# Patient Record
Sex: FEMALE | Race: WHITE | NOT HISPANIC OR LATINO | Employment: FULL TIME | RURAL
[De-identification: names, ages, dates, MRNs, and addresses within clinical notes are randomized per-mention and may not be internally consistent; named-entity substitution may affect disease eponyms.]

---

## 2017-03-27 ENCOUNTER — TELEPHONE (OUTPATIENT)
Dept: FAMILY MEDICINE CLINIC | Facility: CLINIC | Age: 32
End: 2017-03-27

## 2017-03-27 RX ORDER — AMOXICILLIN 500 MG/1
500 CAPSULE ORAL 3 TIMES DAILY
Qty: 21 CAPSULE | Refills: 0 | Status: SHIPPED | OUTPATIENT
Start: 2017-03-27 | End: 2017-04-03

## 2017-03-27 NOTE — TELEPHONE ENCOUNTER
Pt called said that she has a  Bad sinus inf is neeing meds called in also she does have a toenail fungus she wants to know what u suggest she do about that? [phone]

## 2017-04-03 ENCOUNTER — TELEPHONE (OUTPATIENT)
Dept: FAMILY MEDICINE CLINIC | Facility: CLINIC | Age: 32
End: 2017-04-03

## 2017-04-03 RX ORDER — METHYLPREDNISOLONE 4 MG/1
TABLET ORAL
Qty: 21 TABLET | Refills: 0 | Status: SHIPPED | OUTPATIENT
Start: 2017-04-03 | End: 2017-10-16

## 2017-04-03 RX ORDER — LEVOFLOXACIN 500 MG/1
500 TABLET, FILM COATED ORAL DAILY
Qty: 10 TABLET | Refills: 0 | Status: SHIPPED | OUTPATIENT
Start: 2017-04-03 | End: 2017-04-13

## 2017-04-03 NOTE — TELEPHONE ENCOUNTER
Says she took amoxcil last week for a sinus infection after 2 days the drainge was clear. Now has the sxs back again. Drainage is yellow again. No fever. Wondering if she needs a stronger abx to [facility]

## 2017-06-28 ENCOUNTER — TELEPHONE (OUTPATIENT)
Dept: FAMILY MEDICINE CLINIC | Facility: CLINIC | Age: 32
End: 2017-06-28

## 2017-06-28 NOTE — TELEPHONE ENCOUNTER
Pt called she wants to know what your thoughts are on the [facility] in [place] regarding them doing prp injections? [phone]

## 2017-08-15 ENCOUNTER — TRANSCRIBE ORDERS (OUTPATIENT)
Dept: ADMINISTRATIVE | Facility: HOSPITAL | Age: 32
End: 2017-08-15

## 2017-10-04 ENCOUNTER — HOSPITAL ENCOUNTER (EMERGENCY)
Age: 32
Discharge: HOME | End: 2017-10-04
Payer: COMMERCIAL

## 2017-10-04 VITALS
HEART RATE: 76 BPM | SYSTOLIC BLOOD PRESSURE: 118 MMHG | HEIGHT: 64 IN | OXYGEN SATURATION: 97 % | WEIGHT: 135 LBS | BODY MASS INDEX: 23.05 KG/M2 | DIASTOLIC BLOOD PRESSURE: 76 MMHG | RESPIRATION RATE: 18 BRPM | TEMPERATURE: 98.7 F

## 2017-10-04 LAB
ALBUMIN SERPL-MCNC: 4.4 G/DL (ref 3.5–5.2)
ALP BLD-CCNC: 47 U/L (ref 35–104)
ALT SERPL-CCNC: 12 U/L (ref 5–33)
ANION GAP SERPL CALCULATED.3IONS-SCNC: 13 MMOL/L (ref 7–19)
AST SERPL-CCNC: 16 U/L (ref 5–32)
BILIRUB SERPL-MCNC: 0.6 MG/DL (ref 0.2–1.2)
BUN BLDV-MCNC: 16 MG/DL (ref 6–20)
CALCIUM SERPL-MCNC: 9.2 MG/DL (ref 8.6–10)
CHLORIDE BLD-SCNC: 101 MMOL/L (ref 98–111)
CO2: 26 MMOL/L (ref 22–29)
CREAT SERPL-MCNC: 0.8 MG/DL (ref 0.5–0.9)
GFR NON-AFRICAN AMERICAN: >60
GLUCOSE BLD-MCNC: 87 MG/DL (ref 74–109)
HAV IGM SER IA-ACNC: NORMAL
HCG QUALITATIVE: NEGATIVE
HCT VFR BLD CALC: 40.9 % (ref 37–47)
HEMOGLOBIN: 13.5 G/DL (ref 12–16)
HEPATITIS B CORE IGM ANTIBODY: NORMAL
HEPATITIS B SURFACE ANTIGEN INTERPRETATION: NORMAL
HEPATITIS C ANTIBODY INTERPRETATION: NORMAL
MCH RBC QN AUTO: 28.5 PG (ref 27–31)
MCHC RBC AUTO-ENTMCNC: 33 G/DL (ref 33–37)
MCV RBC AUTO: 86.3 FL (ref 81–99)
PDW BLD-RTO: 12.9 % (ref 11.5–14.5)
PLATELET # BLD: 273 K/UL (ref 130–400)
PMV BLD AUTO: 8.6 FL (ref 9.4–12.3)
POTASSIUM SERPL-SCNC: 4 MMOL/L (ref 3.5–5)
RBC # BLD: 4.74 M/UL (ref 4.2–5.4)
SODIUM BLD-SCNC: 140 MMOL/L (ref 136–145)
TOTAL PROTEIN: 7.5 G/DL (ref 6.6–8.7)
WBC # BLD: 6.1 K/UL (ref 4.8–10.8)

## 2017-10-04 PROCEDURE — 80074 ACUTE HEPATITIS PANEL: CPT

## 2017-10-04 PROCEDURE — 99282 EMERGENCY DEPT VISIT SF MDM: CPT | Performed by: NURSE PRACTITIONER

## 2017-10-04 PROCEDURE — 99282 EMERGENCY DEPT VISIT SF MDM: CPT

## 2017-10-04 PROCEDURE — 80053 COMPREHEN METABOLIC PANEL: CPT

## 2017-10-04 PROCEDURE — 86703 HIV-1/HIV-2 1 RESULT ANTBDY: CPT

## 2017-10-04 PROCEDURE — 36415 COLL VENOUS BLD VENIPUNCTURE: CPT

## 2017-10-04 PROCEDURE — 85027 COMPLETE CBC AUTOMATED: CPT

## 2017-10-04 PROCEDURE — 84703 CHORIONIC GONADOTROPIN ASSAY: CPT

## 2017-10-04 NOTE — ED TRIAGE NOTES
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  Pt reports to ED for blood work

## 2017-10-04 NOTE — ED AVS SNAPSHOT
new information is available in [employer]. 9. Click Sign Up. You can now view your medical record. Additional Information  If you have questions, please contact the physician practice where you receive care. Remember, MyChart is NOT to be used for urgent needs. For medical emergencies, dial 911. For questions regarding your MyChart account call [phone]. If you have a clinical question, please call your doctor's office. View your information online  ? Review your current list of  medications, immunization, and allergies. ? Review your future test results online . ? Review your discharge instructions provided by your caregivers at discharge    Certain functionality such as prescription refills, scheduling appointments or sending messages to your provider are not activated if your provider does not use [employer] in his/her office    For questions regarding your [employer] account call [phone]. If you have a clinical question, please call your doctor's office. The information on all pages of the After Visit Summary has been reviewed with me, the patient and/or responsible adult, by my health care provider(s). I had the opportunity to ask questions regarding this information. I understand I should dispose of my armband safely at home to protect my health information. A complete copy of the After Visit Summary has been given to me, the patient and/or responsible adult.          Patient Signature/Responsible Adult: ___________________________________    Nurse Signature: ___________________________________  Resident/MLP Signature: ___________________________________  Attending Signature: ___________________________________    Date:____________Time:____________              More Information           Exposure to Blood and Body Fluids: Care Instructions  Your Care Instructions  When you are caring for another person, there's always a chance that you If you do not have an account, please click on the \"Sign Up Now\" link. Current as of: March 20, 2017  Content Version: 11.3  © 6226-0977 [employer]. Care instructions adapted under license by [facility] [place] ([facility]). If you have questions about a medical condition or this instruction, always ask your healthcare professional. [address] any warranty or liability for your use of this information.

## 2017-10-04 NOTE — ED PROVIDER NOTES
[facility] EMERGENCY DEPT  eMERGENCY dEPARTMENT eNCOUnter      Pt Name: Cheyanne Ortiz  MRN: [id number]  [place] 1985  Date of evaluation: 10/4/2017  Provider: VILMA Fuller    CHIEF COMPLAINT       Chief Complaint   Patient presents with    Other     needle stick at work          HISTORY OF PRESENT ILLNESS   (Location/Symptom, Timing/Onset, Context/Setting, Quality, Duration, Modifying Factors, Severity)  Note limiting factors. Cheyanne Ortiz is a 34 y.o. female who presents to the emergency department for evaluation of needle stick. 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 She tells me that she was poked by a dirty suture needle through her glove today. She tells me the she washed her hands immediately with soap and water. She is anticipating contacting the young women who was the patient for consent to HIV and hepatitis testing. Pt tells me that she received hep b series through her dental program. She didt have a titer drawn after the procedure. HPI    Nursing Notes were reviewed. REVIEW OF SYSTEMS    (2-9 systems for level 4, 10 or more for level 5)     Review of Systems   Constitutional: Negative. Skin: Positive for wound (left thumb base). A complete review of systems was performed and is negative except as noted above in the HPI. PAST MEDICAL HISTORY     Past Medical History:   Diagnosis Date    Anxiety          SURGICAL HISTORY       Past Surgical History:   Procedure Laterality Date     SECTION           CURRENT MEDICATIONS       Discharge Medication List as of 10/4/2017 11:40 AM      CONTINUE these medications which have NOT CHANGED    Details   BusPIRone HCl (BUSPAR PO) Take by mouth nightlyHistorical Med             ALLERGIES     Review of patient's allergies indicates no known allergies. FAMILY HISTORY     History reviewed. No pertinent family history.        SOCIAL HISTORY       Social History     Social History  Marital status:      Spouse name: N/A    Number of children: N/A    Years of education: N/A     Social History Main Topics    Smoking status: Never Smoker    Smokeless tobacco: Never Used    Alcohol use Yes      Comment: occasional    Drug use: No    Sexual activity: Yes     Other Topics Concern    None     Social History Narrative    None       SCREENINGS             PHYSICAL EXAM    (up to 7 for level 4, 8 or more for level 5)   ED Triage Vitals   BP Temp Temp src Pulse Resp SpO2 Height Weight   -- -- -- -- -- -- -- --               Vitals:    10/04/17 1102   BP: 118/76   Pulse: 76   Resp: 18   Temp: 98.7 °F (37.1 °C)   TempSrc: Oral   SpO2: 97%   Weight: 135 lb (61.2 kg)   Height: 5' 4\" (1.626 m)         Physical Exam   Constitutional: She is oriented to person, place, and time. She appears well-nourished. HENT:   Head: Normocephalic. Pulmonary/Chest: Effort normal.   Neurological: She is alert and oriented to person, place, and time. Skin: Skin is warm and dry. Puncture wound base of left thumb. Vitals reviewed.       DIAGNOSTIC RESULTS     EKG: All EKG's are interpreted by the Emergency Department Physician who either signs or Co-signs this chart in the absence of a cardiologist.        RADIOLOGY:   Non-plain film images such as CT, Ultrasound and MRI are read by the radiologist. Plain radiographic images are visualized and preliminarily interpreted by the emergency physician with the below findings:        Interpretation per the Radiologist below, if available at the time of this note:    No orders to display         ED BEDSIDE ULTRASOUND:   Performed by ED Physician - none    LABS:  Labs Reviewed   CBC - Abnormal; Notable for the following:        Result Value    MPV 8.6 (*)     All other components within normal limits   COMPREHENSIVE METABOLIC PANEL   HEPATITIS PANEL, ACUTE   HCG, SERUM, QUALITATIVE   HIV-1,-2 W/REFLEX TO HIV-1 WESTERN BLOT       All other labs were within normal range or not returned as of this dictation. RE-ASSESSMENT     Spoke with [facility]-clinician' post exposure prophylaxis line [phone] w/recommendation not to give hiv prophylaxis. Discussed with patient that it has been estimated that risk 0.35% which is a chance of three conversion in 1,000 HiV positive needle sticks. This was discussed with patient giving risks/benefits of prophylaxis and she declined. EMERGENCY DEPARTMENT COURSE and DIFFERENTIAL DIAGNOSIS/MDM:   Vitals:    Vitals:    10/04/17 1102   BP: 118/76   Pulse: 76   Resp: 18   Temp: 98.7 °F (37.1 °C)   TempSrc: Oral   SpO2: 97%   Weight: 135 lb (61.2 kg)   Height: 5' 4\" (1.626 m)       MDM      CONSULTS:  None    PROCEDURES:  Unless otherwise noted below, none     Procedures    FINAL IMPRESSION      1.  Needle stick injury of finger of left hand, initial encounter          DISPOSITION/PLAN   DISPOSITION     PATIENT REFERRED TO:  your doctor    Schedule an appointment as soon as possible for a visit        DISCHARGE MEDICATIONS:       Discharge Medication List as of 10/4/2017 11:40 AM          (Please note that portions of this note were completed with a voice recognition program.  Efforts were made to edit the dictations but occasionally words are mis-transcribed.)             Adalid Nicholson, APRN  10/04/17 5556

## 2017-10-06 ENCOUNTER — TELEPHONE (OUTPATIENT)
Dept: FAMILY MEDICINE CLINIC | Facility: CLINIC | Age: 32
End: 2017-10-06

## 2017-10-06 LAB — HIV-1 AND HIV-2 ANTIBODIES: NEGATIVE

## 2017-10-06 NOTE — TELEPHONE ENCOUNTER
She got stuck with a suture needle at the Dentist office. She was sent to the ER and had blood work for hepatitis, HIV. Wants to know if she needs to get a follow up appt to discuss lab results, and what protocol to follow?

## 2017-10-16 ENCOUNTER — OFFICE VISIT (OUTPATIENT)
Dept: FAMILY MEDICINE CLINIC | Facility: CLINIC | Age: 32
End: 2017-10-16

## 2017-10-16 VITALS
WEIGHT: 144 LBS | HEART RATE: 78 BPM | RESPIRATION RATE: 16 BRPM | HEIGHT: 64 IN | OXYGEN SATURATION: 97 % | SYSTOLIC BLOOD PRESSURE: 110 MMHG | DIASTOLIC BLOOD PRESSURE: 76 MMHG | BODY MASS INDEX: 24.59 KG/M2

## 2017-10-16 DIAGNOSIS — IMO0002 NEEDLE STICK INJURY: Primary | ICD-10-CM

## 2017-10-16 PROCEDURE — 99213 OFFICE O/P EST LOW 20 MIN: CPT | Performed by: FAMILY MEDICINE

## 2017-10-16 RX ORDER — NORETHINDRONE ACETATE AND ETHINYL ESTRADIOL, ETHINYL ESTRADIOL AND FERROUS FUMARATE 1MG-10(24)
KIT ORAL
Refills: 11 | COMMUNITY
Start: 2017-09-10 | End: 2018-05-15

## 2017-10-16 RX ORDER — BUSPIRONE HYDROCHLORIDE 7.5 MG/1
7.5 TABLET ORAL 2 TIMES DAILY
COMMUNITY
Start: 2017-10-07 | End: 2023-01-27

## 2017-10-16 NOTE — PROGRESS NOTES
"Subjective   Jeni Aguilar is a 32 y.o. female.     Chief Complaint   Patient presents with   • Follow-up     labs        History of Present Illness     Jeni was exposed to needle stick--the dentist was suturing a patient and was stuck and went to [facility]...she reveals to me the patient was tested \"for  hiv and everything\" and the patient was neg..  She was stuck in hand by her employer, dr stevens and instructed to go to [facility]    Current Outpatient Prescriptions:   •  busPIRone (BUSPAR) 7.5 MG tablet, , Disp: , Rfl:   •  LO LOESTRIN FE 1 MG-10 MCG / 10 MCG tablet, TK 1 T PO QD, Disp: , Rfl: 11  No Known Allergies    No past medical history on file.  No past surgical history on file.    Review of Systems   Constitutional: Negative.    HENT: Negative.    Eyes: Negative.    Respiratory: Negative.    Cardiovascular: Negative.    Gastrointestinal: Negative.    Endocrine: Negative.    Genitourinary: Negative.    Musculoskeletal: Negative.    Skin: Negative.    Allergic/Immunologic: Negative.    Neurological: Negative.    Hematological: Negative.    Psychiatric/Behavioral: Negative.        Objective  /76  Pulse 78  Resp 16  Ht 64\" (162.6 cm)  Wt 144 lb (65.3 kg)  SpO2 97%  BMI 24.72 kg/m2  Physical Exam   Constitutional: She appears well-developed and well-nourished.   HENT:   Head: Normocephalic.   Eyes: Pupils are equal, round, and reactive to light.   Neck: Normal range of motion.   Cardiovascular: Normal rate.    Pulmonary/Chest: Effort normal.   Abdominal: Soft.   Musculoskeletal: Normal range of motion.   Neurological: She is alert. She has normal reflexes.   Skin: Skin is warm.   Needle stick inijury has healed   Psychiatric: She has a normal mood and affect. Her behavior is normal. Judgment and thought content normal.   Nursing note and vitals reviewed.      Assessment/Plan   Jeni was seen today for follow-up.    Diagnoses and all orders for this visit:    Needle stick injury    --low " risk    recommeded labs 3mos, 6mos and 12mos       No orders of the defined types were placed in this encounter.      Follow up: prn

## 2018-02-16 ENCOUNTER — TELEPHONE (OUTPATIENT)
Dept: FAMILY MEDICINE CLINIC | Facility: CLINIC | Age: 33
End: 2018-02-16

## 2018-02-16 DIAGNOSIS — IMO0002 NEEDLE STICK INJURY: Primary | ICD-10-CM

## 2018-02-16 NOTE — TELEPHONE ENCOUNTER
Hiv, rpr, hepatits profile now  And in 6mos and in one year--be sure we jhave billing info for this

## 2018-02-17 LAB
HAV AB SER QL IA: NEGATIVE
HBV CORE AB SERPL QL IA: NEGATIVE
HBV SURFACE AB SER QL: REACTIVE
HBV SURFACE AG SERPL QL IA: NEGATIVE
HCV AB S/CO SERPL IA: <0.1 S/CO RATIO (ref 0–0.9)
HIV 1+2 AB+HIV1 P24 AG SERPL QL IA: NON REACTIVE
RPR SER QL: NON REACTIVE

## 2018-05-07 ENCOUNTER — TELEPHONE (OUTPATIENT)
Dept: FAMILY MEDICINE CLINIC | Facility: CLINIC | Age: 33
End: 2018-05-07

## 2018-05-07 DIAGNOSIS — J32.9 CHRONIC SINUSITIS, UNSPECIFIED LOCATION: Primary | ICD-10-CM

## 2018-05-07 RX ORDER — LEVOFLOXACIN 500 MG/1
500 TABLET, FILM COATED ORAL DAILY
Qty: 14 TABLET | Refills: 0 | Status: SHIPPED | OUTPATIENT
Start: 2018-05-07 | End: 2018-05-15

## 2018-05-15 ENCOUNTER — OFFICE VISIT (OUTPATIENT)
Dept: OTOLARYNGOLOGY | Facility: CLINIC | Age: 33
End: 2018-05-15

## 2018-05-15 VITALS
HEART RATE: 77 BPM | BODY MASS INDEX: 24.59 KG/M2 | DIASTOLIC BLOOD PRESSURE: 88 MMHG | WEIGHT: 144 LBS | SYSTOLIC BLOOD PRESSURE: 124 MMHG | TEMPERATURE: 98 F | HEIGHT: 64 IN

## 2018-05-15 DIAGNOSIS — J34.3 HYPERTROPHY OF INFERIOR NASAL TURBINATE: ICD-10-CM

## 2018-05-15 DIAGNOSIS — J30.9 ALLERGIC RHINITIS, UNSPECIFIED CHRONICITY, UNSPECIFIED SEASONALITY, UNSPECIFIED TRIGGER: ICD-10-CM

## 2018-05-15 DIAGNOSIS — J32.9 CHRONIC SINUSITIS, UNSPECIFIED LOCATION: Primary | ICD-10-CM

## 2018-05-15 DIAGNOSIS — J34.2 DEVIATED NASAL SEPTUM: ICD-10-CM

## 2018-05-15 DIAGNOSIS — J01.90 ACUTE SINUSITIS, RECURRENCE NOT SPECIFIED, UNSPECIFIED LOCATION: ICD-10-CM

## 2018-05-15 PROCEDURE — 99203 OFFICE O/P NEW LOW 30 MIN: CPT | Performed by: NURSE PRACTITIONER

## 2018-05-15 RX ORDER — OLOPATADINE HYDROCHLORIDE 665 UG/1
2 SPRAY NASAL 2 TIMES DAILY
Qty: 1 BOTTLE | Refills: 5 | Status: SHIPPED | OUTPATIENT
Start: 2018-05-15 | End: 2018-09-18 | Stop reason: SDUPTHER

## 2018-05-15 RX ORDER — LEVOFLOXACIN 500 MG/1
500 TABLET, FILM COATED ORAL DAILY
COMMUNITY
End: 2018-07-05

## 2018-05-15 RX ORDER — METHYLPREDNISOLONE 4 MG/1
TABLET ORAL
Qty: 1 EACH | Refills: 0 | Status: SHIPPED | OUTPATIENT
Start: 2018-05-15 | End: 2018-05-21

## 2018-05-15 RX ORDER — FLUTICASONE PROPIONATE 50 MCG
1 SPRAY, SUSPENSION (ML) NASAL 2 TIMES DAILY
COMMUNITY
Start: 2018-05-08 | End: 2018-09-18 | Stop reason: SDUPTHER

## 2018-05-15 NOTE — PATIENT INSTRUCTIONS
Medical vs surgical options discussed    Call for problems or worsening symptoms    Avoidance of allergies discussed.  Use masks when performing yardwork or cleaning activities if indicated.  Continue allergy medications as discussed.    Air purifier as needed.  If on nasal sprays, recommend to use daily as indicated.   Medical vs surgical options discussed.

## 2018-05-15 NOTE — PROGRESS NOTES
YOB: 1985  Location: [facility]  Location Address: [address] [place] [address], [facility], [address] [place] [address]  Location Phone: [phone]    Chief Complaint   Patient presents with   • Sinus Problem       History of Present Illness  Jeni Aguilar is a 32 y.o. female.  Jeni Aguilar is here for evaluation of ENT complaints. The patient has had problems with nasal congestion, sinusitis, repeated sinusitis, sinus pressure and allergy  The symptoms are not localized to a particular location. The patient has had severe symptoms. The symptoms have been present for the last 1 month The symptoms are aggravated by  allergy. The symptoms are improved by no identifiable factors. She has been treated with oral abx Augmentin, Flonase OTC allergy meds without improvement.  She is now taking Levaquin. She had imaging studies at her dentist office - images not able to pull up from disc.  She reports sinus pressure bilaterally.  Reports allergies has never been tested.  She has a lot of nasal drainage.  Denies nasal trauma.       Past Medical History:   Diagnosis Date   • Allergic rhinitis        Past Surgical History:   Procedure Laterality Date   •  SECTION         Outpatient Prescriptions Marked as Taking for the 5/15/18 encounter (Office Visit) with JERRY Christie   Medication Sig Dispense Refill   • busPIRone (BUSPAR) 7.5 MG tablet      • fluticasone (FLONASE) 50 MCG/ACT nasal spray      • levoFLOXacin (LEVAQUIN) 500 MG tablet Take 500 mg by mouth Daily.     • Pseudoephedrine-Ibuprofen (ADVIL COLD/SINUS PO) Take  by mouth.     • Sodium Chloride-Sodium Bicarb (NETI POT SINUS WASH NA) into each nostril.     • [DISCONTINUED] levoFLOXacin (LEVAQUIN) 500 MG tablet Take 1 tablet by mouth Daily. 14 tablet 0       Review of patient's allergies indicates no known allergies.    Family History   Problem Relation Age of Onset   • No Known Problems Mother    • No Known Problems Father        Social History      Social History   • Marital status: Unknown     Spouse name: N/A   • Number of children: N/A   • Years of education: N/A     Occupational History   • Not on file.     Social History Main Topics   • Smoking status: Never Smoker   • Smokeless tobacco: Never Used   • Alcohol use Yes      Comment: rarely   • Drug use: Unknown   • Sexual activity: Not on file     Other Topics Concern   • Not on file     Social History Narrative   • No narrative on file       Review of Systems   Constitutional: Negative.    HENT:        SEE HPI   Eyes: Negative.    Respiratory: Negative.    Cardiovascular: Negative.    Gastrointestinal: Negative.    Endocrine: Negative.    Genitourinary: Negative.    Musculoskeletal: Negative.    Skin: Negative.    Allergic/Immunologic: Negative.    Neurological: Negative.    Hematological: Negative.    Psychiatric/Behavioral: Negative.        Vitals:    05/15/18 1007   BP: 124/88   Pulse: 77   Temp: 98 °F (36.7 °C)       Body mass index is 24.72 kg/m².    Objective     Physical Exam  CONSTITUTIONAL: well nourished, alert, oriented, in no acute distress     COMMUNICATION AND VOICE: able to communicate normally, normal voice quality    HEAD: normocephalic, no lesions, atraumatic, no tenderness, no masses     FACE: appearance normal, no lesions, no tenderness, no deformities, facial motion symmetric    SALIVARY GLANDS: parotid glands with no tenderness, no swelling, no masses, submandibular glands with normal size, nontender    EYES: ocular motility normal, eyelids normal, orbits normal, no proptosis, conjunctiva normal , pupils equal, round     EARS:  Hearing: response to conversational voice normal bilaterally   External Ears: auricles without lesions  Otoscopic: tympanic membrane appearance normal, no lesions, no perforation, normal mobility, no fluid    NOSE:  External Nose: right dorsal deviation  Intranasal Exam: nasal mucosa edema,  vestibule within normal limits, inferior turbinate hypertrophy,  left septal deviation    ORAL:  Lips: upper and lower lips without lesion   Teeth: dentition within normal limits for age   Gums: gingivae healthy   Oral Mucosa: oral mucosa normal, no mucosal lesions   Floor of Mouth: Warthin’s duct patent, mucosa normal  Tongue: lingual mucosa normal without lesions, normal tongue mobility   Palate: soft and hard palates with normal mucosa and structure  Oropharynx: oropharyngeal mucosa normal    NECK: neck appearance normal, no mass,  noted without erythema or tenderness    LYMPH NODES: no lymphadenopathy    CHEST/RESPIRATORY: respiratory effort normal  CARDIOVASCULAR:  extremities without cyanosis or edema      NEUROLOGIC/PSYCHIATRIC: oriented to time, place and person, mood normal, affect appropriate, CN II-XII intact grossly    Assessment/Plan   Jeni was seen today for sinus problem.    Diagnoses and all orders for this visit:    Chronic sinusitis, unspecified location  -     CT Sinus Without Contrast; Future    Deviated nasal septum    Hypertrophy of inferior nasal turbinate    Acute sinusitis, recurrence not specified, unspecified location    Allergic rhinitis, unspecified chronicity, unspecified seasonality, unspecified trigger    Other orders  -     MethylPREDNISolone (MEDROL) 4 MG tablet; Take as directed on package instructions.  -     olopatadine (PATANASE) 0.6 % solution nasal solution; 2 sprays by Each Nare route 2 (Two) Times a Day.      * Surgery not found *  Orders Placed This Encounter   Procedures   • CT Sinus Without Contrast     Standing Status:   Future     Standing Expiration Date:   5/15/2019     Order Specific Question:   Patient Pregnant     Answer:   Unknown     Return in about 4 weeks (around 6/12/2018).       Patient Instructions   Medical vs surgical options discussed    Call for problems or worsening symptoms    Avoidance of allergies discussed.  Use masks when performing yardwork or cleaning activities if indicated.  Continue allergy medications  as discussed.    Air purifier as needed.  If on nasal sprays, recommend to use daily as indicated.   Medical vs surgical options discussed.

## 2018-07-05 ENCOUNTER — OFFICE VISIT (OUTPATIENT)
Dept: OTOLARYNGOLOGY | Facility: CLINIC | Age: 33
End: 2018-07-05

## 2018-07-05 ENCOUNTER — CLINICAL SUPPORT (OUTPATIENT)
Dept: OTOLARYNGOLOGY | Facility: CLINIC | Age: 33
End: 2018-07-05

## 2018-07-05 VITALS
BODY MASS INDEX: 26.12 KG/M2 | HEIGHT: 64 IN | WEIGHT: 153 LBS | SYSTOLIC BLOOD PRESSURE: 122 MMHG | DIASTOLIC BLOOD PRESSURE: 77 MMHG | TEMPERATURE: 98.6 F

## 2018-07-05 DIAGNOSIS — J34.3 HYPERTROPHY OF INFERIOR NASAL TURBINATE: ICD-10-CM

## 2018-07-05 DIAGNOSIS — J30.9 CHRONIC ALLERGIC RHINITIS, UNSPECIFIED SEASONALITY, UNSPECIFIED TRIGGER: ICD-10-CM

## 2018-07-05 DIAGNOSIS — J01.00 SUBACUTE MAXILLARY SINUSITIS: ICD-10-CM

## 2018-07-05 DIAGNOSIS — J34.2 DEVIATED NASAL SEPTUM: ICD-10-CM

## 2018-07-05 DIAGNOSIS — J32.9 CHRONIC SINUSITIS, UNSPECIFIED LOCATION: Primary | ICD-10-CM

## 2018-07-05 DIAGNOSIS — J34.89 CONCHA BULLOSA: ICD-10-CM

## 2018-07-05 PROCEDURE — 99214 OFFICE O/P EST MOD 30 MIN: CPT | Performed by: NURSE PRACTITIONER

## 2018-07-05 PROCEDURE — 70486 CT MAXILLOFACIAL W/O DYE: CPT | Performed by: NURSE PRACTITIONER

## 2018-07-05 RX ORDER — FEXOFENADINE HYDROCHLORIDE 60 MG/1
60 TABLET, FILM COATED ORAL DAILY
COMMUNITY

## 2018-07-05 RX ORDER — NORETHINDRONE ACETATE AND ETHINYL ESTRADIOL, ETHINYL ESTRADIOL AND FERROUS FUMARATE 1MG-10(24)
1-10 KIT ORAL DAILY
COMMUNITY
Start: 2018-06-25 | End: 2018-10-24

## 2018-07-05 NOTE — PATIENT INSTRUCTIONS
Avoidance of allergies discussed.  Use masks when performing yardwork or cleaning activities if indicated.  Continue allergy medications as discussed.    Air purifier as needed.  If on nasal sprays, recommend to use daily as indicated.   Medical vs surgical options discussed.    Call for problems or worsening symptoms

## 2018-07-05 NOTE — PROGRESS NOTES
YOB: 1985  Location: [facility]  Location Address: [address] [place] [address], [facility], [address] [place] [address]  Location Phone: [phone]    Chief Complaint   Patient presents with   • Chronic sinusitis     Patient is here for a follow-up for chronic sinusitis.       History of Present Illness  Jeni Aguilar is a 32 y.o. female.  Jeni Aguilar is here for follow up of ENT complaints. The patient has had problems with nasal congestion, repeated sinusitis and allergy  The symptoms are not localized to a particular location. The patient has had improving symptoms. The symptoms have been present for the last several months The symptoms are aggravated by  no identifiable factors. The symptoms are improved by no identifiable factors.  She has 1-2 sinus infections per years.  Flonase and Astelin are helping      CT IMAGES reviewed         Past Medical History:   Diagnosis Date   • Allergic rhinitis        Past Surgical History:   Procedure Laterality Date   •  SECTION         Outpatient Prescriptions Marked as Taking for the 18 encounter (Office Visit) with JERRY Christie   Medication Sig Dispense Refill   • busPIRone (BUSPAR) 7.5 MG tablet 7.5 mg every night at bedtime.     • fexofenadine (ALLEGRA) 60 MG tablet Take 60 mg by mouth Daily.     • fluticasone (FLONASE) 50 MCG/ACT nasal spray 1 spray into each nostril 2 (Two) Times a Day.     • LO LOESTRIN FE 1 MG-10 MCG / 10 MCG tablet 1-10 mcg Daily.     • olopatadine (PATANASE) 0.6 % solution nasal solution 2 sprays by Each Nare route 2 (Two) Times a Day. 1 bottle 5   • Pseudoephedrine-Ibuprofen (ADVIL COLD/SINUS PO) Take  by mouth As Needed.     • [DISCONTINUED] Sodium Chloride-Sodium Bicarb (NETI POT SINUS WASH NA) into each nostril.         Patient has no known allergies.    Family History   Problem Relation Age of Onset   • No Known Problems Mother    • No Known Problems Father        Social History     Social History   • Marital  status: Unknown     Spouse name: N/A   • Number of children: N/A   • Years of education: N/A     Occupational History   • Not on file.     Social History Main Topics   • Smoking status: Never Smoker   • Smokeless tobacco: Never Used   • Alcohol use Yes      Comment: rarely   • Drug use: Unknown   • Sexual activity: Not on file     Other Topics Concern   • Not on file     Social History Narrative   • No narrative on file       Review of Systems   Constitutional: Negative.    HENT:        SEE HPI   Eyes: Negative.    Respiratory: Negative.    Cardiovascular: Negative.    Gastrointestinal: Negative.    Endocrine: Negative.    Genitourinary: Negative.    Musculoskeletal: Negative.    Skin: Negative.    Allergic/Immunologic: Positive for environmental allergies.   Neurological: Negative.    Hematological: Negative.    Psychiatric/Behavioral: Negative.        Vitals:    07/05/18 1347   BP: 122/77   Temp: 98.6 °F (37 °C)       Body mass index is 26.26 kg/m².    Objective     Physical Exam  CONSTITUTIONAL: well nourished, alert, oriented, in no acute distress     COMMUNICATION AND VOICE: able to communicate normally, normal voice quality    HEAD: normocephalic, no lesions, atraumatic, no tenderness, no masses     FACE: appearance normal, no lesions, no tenderness, no deformities, facial motion symmetric    SALIVARY GLANDS: parotid glands with no tenderness, no swelling, no masses, submandibular glands with normal size, nontender    EYES: ocular motility normal, eyelids normal, orbits normal, no proptosis, conjunctiva normal , pupils equal, round     EARS:  Hearing: response to conversational voice normal bilaterally   External Ears: auricles without lesions  Otoscopic: tympanic membrane appearance normal, no lesions, no perforation, normal mobility, no fluid    NOSE:  External Nose: right dorsal deviation  Intranasal Exam: nasal mucosa edema vestibule within normal limits, inferior turbinate hypertrophy, left septal  deviation    ORAL:  Lips: upper and lower lips without lesion   Teeth: dentition within normal limits for age   Gums: gingivae healthy   Oral Mucosa: oral mucosa normal, no mucosal lesions   Floor of Mouth: Warthin’s duct patent, mucosa normal  Tongue: lingual mucosa normal without lesions, normal tongue mobility   Palate: soft and hard palates with normal mucosa and structure  Oropharynx: oropharyngeal mucosa normal    NECK: neck appearance normal, no mass,  noted without erythema or tenderness      LYMPH NODES: no lymphadenopathy    CHEST/RESPIRATORY: respiratory effort normal,      CARDIOVASCULAR:, extremities without cyanosis or edema      NEUROLOGIC/PSYCHIATRIC: oriented to time, place and person, mood normal, affect appropriate, CN II-XII intact grossly    Assessment/Plan   Jeni was seen today for chronic sinusitis.    Diagnoses and all orders for this visit:    Chronic sinusitis, unspecified location    Chronic allergic rhinitis, unspecified seasonality, unspecified trigger    Hypertrophy of inferior nasal turbinate    Deviated nasal septum    Emili bullosa      * Surgery not found *  No orders of the defined types were placed in this encounter.    Return in about 1 year (around 7/5/2019).       Patient Instructions     Avoidance of allergies discussed.  Use masks when performing yardwork or cleaning activities if indicated.  Continue allergy medications as discussed.    Air purifier as needed.  If on nasal sprays, recommend to use daily as indicated.   Medical vs surgical options discussed.    Call for problems or worsening symptoms

## 2018-07-18 DIAGNOSIS — J32.9 CHRONIC SINUSITIS, UNSPECIFIED LOCATION: ICD-10-CM

## 2018-09-13 ENCOUNTER — TELEPHONE (OUTPATIENT)
Dept: FAMILY MEDICINE CLINIC | Facility: CLINIC | Age: 33
End: 2018-09-13

## 2018-09-13 NOTE — TELEPHONE ENCOUNTER
adenike pt stated that she has a bug bite on her arm not sure what kind of a bug or spider she wants to know if u will just take a peek at it in the am when u see her daughter?if so u want me to put in for an appt? [phone]

## 2018-09-14 ENCOUNTER — OFFICE VISIT (OUTPATIENT)
Dept: FAMILY MEDICINE CLINIC | Facility: CLINIC | Age: 33
End: 2018-09-14

## 2018-09-14 VITALS
BODY MASS INDEX: 24.75 KG/M2 | HEIGHT: 64 IN | OXYGEN SATURATION: 98 % | HEART RATE: 74 BPM | DIASTOLIC BLOOD PRESSURE: 74 MMHG | RESPIRATION RATE: 16 BRPM | SYSTOLIC BLOOD PRESSURE: 116 MMHG | WEIGHT: 145 LBS

## 2018-09-14 DIAGNOSIS — S40.861A: Primary | ICD-10-CM
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
## 2018-09-14 PROBLEM — S40.869A INSECT BITE OF UPPER ARM: Status: ACTIVE | Noted: 2018-09-14

## 2018-09-14 PROCEDURE — 99213 OFFICE O/P EST LOW 20 MIN: CPT | Performed by: FAMILY MEDICINE

## 2018-09-14 RX ORDER — MUPIROCIN CALCIUM 20 MG/G
CREAM TOPICAL 3 TIMES DAILY
Qty: 15 G | Refills: 0 | Status: SHIPPED | OUTPATIENT
Start: 2018-09-14 | End: 2018-10-24

## 2018-09-14 NOTE — PROGRESS NOTES
"Subjective   Jeni Aguilar is a 33 y.o. female.     Chief Complaint   Patient presents with   • Insect Bite     right upper arm        History of Present Illness     was bitten by an insect 6 days ago --now with ulcerated area on the right upper arm      Current Outpatient Prescriptions:   •  busPIRone (BUSPAR) 7.5 MG tablet, 7.5 mg every night at bedtime., Disp: , Rfl:   •  fexofenadine (ALLEGRA) 60 MG tablet, Take 60 mg by mouth Daily., Disp: , Rfl:   •  fluticasone (FLONASE) 50 MCG/ACT nasal spray, 1 spray into each nostril 2 (Two) Times a Day., Disp: , Rfl:   •  LO LOESTRIN FE 1 MG-10 MCG / 10 MCG tablet, 1-10 mcg Daily., Disp: , Rfl:   •  mupirocin (BACTROBAN) 2 % cream, Apply  topically to the appropriate area as directed 3 (Three) Times a Day., Disp: 15 g, Rfl: 0  •  olopatadine (PATANASE) 0.6 % solution nasal solution, 2 sprays by Each Nare route 2 (Two) Times a Day., Disp: 1 bottle, Rfl: 5  •  Pseudoephedrine-Ibuprofen (ADVIL COLD/SINUS PO), Take  by mouth As Needed., Disp: , Rfl:   No Known Allergies    Past Medical History:   Diagnosis Date   • Allergic rhinitis      Past Surgical History:   Procedure Laterality Date   •  SECTION         Review of Systems   Skin: Positive for wound.       Objective  /74   Pulse 74   Resp 16   Ht 162.6 cm (64\")   Wt 65.8 kg (145 lb)   SpO2 98%   BMI 24.89 kg/m²   Physical Exam   Constitutional: She appears well-developed and well-nourished.   Skin: Capillary refill takes less than 2 seconds. There is erythema.   Nursing note and vitals reviewed.  noted 1cm exocoriated area right upper arm    Assessment/Plan   Jeni was seen today for insect bite.    Diagnoses and all orders for this visit:    Insect bite of upper arm, right, initial encounter    Other orders  -     mupirocin (BACTROBAN) 2 % cream; Apply  topically to the appropriate area as directed 3 (Three) Times a Day.        She will let me know if not rsolved in 2 weeks         No orders " of the defined types were placed in this encounter.      Follow up: prn

## 2018-09-18 RX ORDER — OLOPATADINE HYDROCHLORIDE 665 UG/1
2 SPRAY NASAL 2 TIMES DAILY
Qty: 1 BOTTLE | Refills: 5 | Status: SHIPPED | OUTPATIENT
Start: 2018-09-18 | End: 2023-01-27

## 2018-09-18 RX ORDER — FLUTICASONE PROPIONATE 50 MCG
1 SPRAY, SUSPENSION (ML) NASAL 2 TIMES DAILY
Qty: 1 BOTTLE | Refills: 6 | Status: SHIPPED | OUTPATIENT
Start: 2018-09-18

## 2018-09-18 RX ORDER — METHYLPREDNISOLONE 4 MG/1
TABLET ORAL
Qty: 1 EACH | Refills: 0 | Status: SHIPPED | OUTPATIENT
Start: 2018-09-18 | End: 2018-09-19 | Stop reason: SDUPTHER

## 2018-09-18 RX ORDER — SULFAMETHOXAZOLE AND TRIMETHOPRIM 800; 160 MG/1; MG/1
1 TABLET ORAL 2 TIMES DAILY
Qty: 20 TABLET | Refills: 0 | Status: SHIPPED | OUTPATIENT
Start: 2018-09-18 | End: 2018-09-28

## 2018-09-19 RX ORDER — METHYLPREDNISOLONE 4 MG/1
TABLET ORAL
Qty: 1 EACH | Refills: 0 | Status: SHIPPED | OUTPATIENT
Start: 2018-09-19 | End: 2018-09-25

## 2018-10-08 DIAGNOSIS — IMO0001 NEEDLE STICK INJURY WITH CONTAMINATED NEEDLE, INITIAL ENCOUNTER: Primary | ICD-10-CM

## 2018-10-24 ENCOUNTER — OFFICE VISIT (OUTPATIENT)
Dept: OTOLARYNGOLOGY | Facility: CLINIC | Age: 33
End: 2018-10-24

## 2018-10-24 VITALS
DIASTOLIC BLOOD PRESSURE: 78 MMHG | SYSTOLIC BLOOD PRESSURE: 110 MMHG | HEIGHT: 64 IN | WEIGHT: 158.2 LBS | BODY MASS INDEX: 27.01 KG/M2 | TEMPERATURE: 98.3 F

## 2018-10-24 DIAGNOSIS — J34.3 HYPERTROPHY OF INFERIOR NASAL TURBINATE: ICD-10-CM

## 2018-10-24 DIAGNOSIS — J31.0 CHRONIC RHINITIS: ICD-10-CM

## 2018-10-24 DIAGNOSIS — J30.9 ALLERGIC RHINITIS, UNSPECIFIED SEASONALITY, UNSPECIFIED TRIGGER: Primary | ICD-10-CM

## 2018-10-24 PROCEDURE — 99214 OFFICE O/P EST MOD 30 MIN: CPT | Performed by: OTOLARYNGOLOGY

## 2018-10-24 NOTE — PATIENT INSTRUCTIONS
For the best response, use your nasal sprays every day without skipping doses. It may take several weeks before the full effect is acheived.   Hold antihistamine containing medications (prescribed and over the counter) 1 week prior to the scheduled allergy testing.       MyPlate from [employer]  The general, healthful diet is based on the 2010 Dietary Guidelines for Americans. The amount of food you need to eat from each food group depends on your age, sex, and level of physical activity and can be individualized by a dietitian. Go to ChooseMyPlate.gov for more information.  What do I need to know about the MyPlate plan?  · Enjoy your food, but eat less.  · Avoid oversized portions.  ? ½ of your plate should include fruits and vegetables.  ? ¼ of your plate should be grains.  ? ¼ of your plate should be protein.  Grains  · Make at least half of your grains whole grains.  · For a 2,000 calorie daily food plan, eat 6 oz every day.  · 1 oz is about 1 slice bread, 1 cup cereal, or ½ cup cooked rice, cereal, or pasta.  Vegetables  · Make half your plate fruits and vegetables.  · For a 2,000 calorie daily food plan, eat 2½ cups every day.  · 1 cup is about 1 cup raw or cooked vegetables or vegetable juice or 2 cups raw leafy greens.  Fruits  · Make half your plate fruits and vegetables.  · For a 2,000 calorie daily food plan, eat 2 cups every day.  · 1 cup is about 1 cup fruit or 100% fruit juice or ½ cup dried fruit.  Protein  · For a 2,000 calorie daily food plan, eat 5½ oz every day.  · 1 oz is about 1 oz meat, poultry, or fish, ¼ cup cooked beans, 1 egg, 1 Tbsp peanut butter, or ½ oz nuts or seeds.  Dairy  · Switch to fat-free or low-fat (1%) milk.  · For a 2,000 calorie daily food plan, eat 3 cups every day.  · 1 cup is about 1 cup milk or yogurt or soy milk (soy beverage), 1½ oz natural cheese, or 2 oz processed cheese.  Fats, Oils, and Empty Calories  · Only small amounts of oils are recommended.  · Empty calories are  calories from solid fats or added sugars.  · Compare sodium in foods like soup, bread, and frozen meals. Choose the foods with lower numbers.  · Drink water instead of sugary drinks.  What foods can I eat?  Grains  Whole grains such as whole wheat, quinoa, millet, and bulgur. Bread, rolls, and pasta made from whole grains. Brown or wild rice. Hot or cold cereals made from whole grains and without added sugar.  Vegetables  All fresh vegetables, especially fresh red, dark green, or orange vegetables. Peas and beans. Low-sodium frozen or canned vegetables prepared without added salt. Low-sodium vegetable juices.  Fruits  All fresh, frozen, and dried fruits. Canned fruit packed in water or fruit juice without added sugar. Fruit juices without added sugar.  Meats and Other Protein Sources  Boiled, baked, or grilled lean meat trimmed of fat. Skinless poultry. Fresh seafood and shellfish. Canned seafood packed in water. Unsalted nuts and unsalted nut butters. Tofu. Dried beans and pea. Eggs.  Dairy  Low-fat or fat-free milk, yogurt, and cheeses.  Sweets and Desserts  Frozen desserts made from low-fat milk.  Fats and Oils  Olive, peanut, and canola oils and margarine. Salad dressing and mayonnaise made from these oils.  Other  Soups and casseroles made from allowed ingredients and without added fat or salt.  The items listed above may not be a complete list of recommended foods or beverages. Contact your dietitian for more options.  What foods are not recommended?  Grains  Sweetened, low-fiber cereals. Packaged baked goods. Snack crackers and chips. Cheese crackers, butter crackers, and biscuits. Frozen waffles, sweet breads, doughnuts, pastries, packaged baking mixes, pancakes, cakes, and cookies.  Vegetables  Regular canned or frozen vegetables or vegetables prepared with salt. Canned tomatoes. Canned tomato sauce. Fried vegetables. Vegetables in cream sauce or cheese sauce.  Fruits  Fruits packed in syrup or made with  added sugar.  Meats and Other Protein Sources  Marbled or fatty meats such as ribs. Poultry with skin. Fried meats, poultry, eggs, or fish. Sausages, hot dogs, and deli meats such as pastrami, bologna, or salami.  Dairy  Whole milk, cream, cheeses made from whole milk, sour cream. Ice cream or yogurt made from whole milk or with added sugar.  Beverages  For adults, no more than one alcoholic drink per day. Regular soft drinks or other sugary beverages. Juice drinks.  Sweets and Desserts  Sugary or fatty desserts, candy, and other sweets.  Fats and Oils  Solid shortening or partially hydrogenated oils. Solid margarine. Margarine that contains trans fats. Butter.  The items listed above may not be a complete list of foods and beverages to avoid. Contact your dietitian for more information.  This information is not intended to replace advice given to you by your health care provider. Make sure you discuss any questions you have with your health care provider.  Document Released: 01/06/2009 Document Revised: 05/25/2017 Document Reviewed: 11/26/2014  [employer] Patient Education © 2018 [employer]

## 2018-10-24 NOTE — PROGRESS NOTES
Kim Ortiz MA   Patient Intake Note    Review of Systems  Review of Systems   HENT:        See hpi     Respiratory: Negative for cough, choking, shortness of breath and wheezing.    Neurological: Positive for headaches. Negative for dizziness and light-headedness.   Hematological: Does not bruise/bleed easily.   Psychiatric/Behavioral: Negative for sleep disturbance.   All other systems reviewed and are negative.      QUALITY MEASURES    Body Mass Index Screening and Follow-Up Plan  Body mass index is 27.15 kg/m².   Patient's Body mass index is 27.15 kg/m². BMI is above normal parameters. Recommendations include: educational material.    Tobacco Use: Screening and Cessation Intervention  Smoking status: Never Smoker                                                              Smokeless tobacco: Never Used                            Kim Ortiz MA  10/24/2018  1:39 PM

## 2018-10-24 NOTE — PROGRESS NOTES
Francisco Santoyo MD     Chief Complaint   Patient presents with   • Follow-up       HPI   Jeni Aguilar is a  33 y.o. female who is here for follow up.  She continues to have intermittent difficulty with nasal congestion and drainage with her nose.  She had a recent flareup a few weeks ago which has resolved.  She tends to have flareups after exposures to hay and other outdoor items.    Review of Systems:  Reviewed per patient intake note    Past History:  Past medical and surgical history, family history and social history reviewed and updated when appropriate.  Current medications and allergies reviewed and updated when appropriate.  Allergies:  Patient has no known allergies.    Vital Signs:   Temp:  [98.3 °F (36.8 °C)] 98.3 °F (36.8 °C)  BP: (110)/(78) 110/78    Physical Exam   CONSTITUTIONAL: well nourished, well-developed, alert, oriented, in no acute distress   COMMUNICATION AND VOICE: able to communicate normally, normal voice quality  HEAD: normocephalic, no lesions, atraumatic, no tenderness, no masses   FACE: appearance normal, no lesions, no tenderness, no deformities, facial motion symmetric  EYES: ocular motility normal, eyelids normal, orbits normal, no proptosis, conjunctiva normal , pupils equal, round  HEARING: response to conversational voice normal bilaterally   EXTERNAL EARS: auricles without lesions  EXTERNAL NOSE: structure normal, no tenderness on palpation, no nasal discharge, no lesions, no evidence of trauma, nostrils patent  INTRANASAL EXAM: nasal mucosa with mucosal congestion and erythema, nasal septum without overt anterior deviation except a left inferior flair off the crest  LIPS: structure normal, no tenderness on palpation, no lesions, no evidence of trauma  NECK: neck appearance normal  LYMPH NODES: no lymphadenopathy  CHEST/RESPIRATORY: respiratory effort normal  CARDIOVASCULAR: extremities without cyanosis or edema, no overt jugulovenous distension  present  NEUROLOGIC/PSYCHIATRIC: oriented appropriately for age, mood normal, affect appropriate, cranial nerves intact grossly unless specifically mentioned above         Assessment   1. Allergic rhinitis, unspecified seasonality, unspecified trigger    2. Chronic rhinitis    3. Hypertrophy of inferior nasal turbinate        Plan    Medical and surgical options were discussed including continued medical management, allergy testing and office turbinoplasty. Risks, benefits and alternatives were discussed and questions were answered. After considering the options, the patient decided to proceed with continued medical management and allergy testing.  Continue current management plan.  Orders Placed This Encounter   Procedures   • Intradermal Allergy Testing       Return in about 6 weeks (around 12/5/2018).    Francisco Santoyo MD  10/24/18  4:42 PM

## 2018-11-02 ENCOUNTER — PROCEDURE VISIT (OUTPATIENT)
Dept: OTOLARYNGOLOGY | Facility: CLINIC | Age: 33
End: 2018-11-02

## 2018-11-02 DIAGNOSIS — J30.9 ALLERGIC RHINITIS, UNSPECIFIED SEASONALITY, UNSPECIFIED TRIGGER: Primary | ICD-10-CM

## 2018-11-02 PROCEDURE — 95004 PERQ TESTS W/ALRGNC XTRCS: CPT | Performed by: OTOLARYNGOLOGY

## 2018-11-02 PROCEDURE — 95024 IQ TESTS W/ALLERGENIC XTRCS: CPT | Performed by: OTOLARYNGOLOGY

## 2018-11-02 NOTE — PROGRESS NOTES
Allergy History Questionnaire  Jeni F Jeff 1985 11/2/2018  Occupation: Dental Assistant    Symptoms  (Check which applies)  Severity? Frequency? When are they worse?   [] Mild [] Constant [x] Spring   [x] Moderate [] Erratic [] Summer   [x] Severe [x] Occasional [x] Fall   [] Variable [x] Seasonal [] Winter     [] Year Round       Do they interfere with your life? Do they interfere with your sleep?   [] Note at all [x] Yes   [] A little [] No   [x] Moderately [x] If yes, please explain: Sinus Pressure with drainage. ___________________________________   [] Prevents normal activity        Please check the symptoms that apply to your allergy symptoms.  Nose Eyes Ears   [] Bleeding [] Infections [] Buzzing   [] Crusting [x] Itching [] Dizziness   [x] Dryness [x] Redness [] Drainage   [x] Itching [] Swollen Lids [] Fullness   [x] Nasal Congestion [x] Watery [] Hearing Loss   [x] Nasal Drainage [] None [] Itching   [] Nasal Polyps  [] Pain   [] Septal Deviation  [] Pressure   [x] Sneezing     [] None         Throat Mouth Chest   [] Burning [] Burning [] Asthma as a child   [x] Dryness [x] Dryness [] Asthma as an adult   [x] Hoarseness [] Itching [] Bronchitis   [] Laryngitis [x] Mouth Breathing [x] Cough   [] Snoring [] None [x] Pneumonia   [x] Sore Throats  [] Wheezing   [] Tonsillectomy  [] None   [] Vocal Cord Polyps     [] None           Skin   [x] Dryness   [x] Eczema   [] Hives   [x] Itching   [] Rash   [] Swelling   [] None     Other Irritants: none    Environment    Inside Home? Smoking Habits?   [] Carpeting [] Cigarettes   [x] Hardwood [] Cigars   [] Fireplace (Gas or Wood Burning) [] Pipe   [x] Laminate Floor [] Years Smoked   [] Plants [] Stopped Smoking    [] Tile [x] Exposed to Secondhand Smoke     Animals in the home? Near your home?   [] Bird(s) [] Barn   [] Cat(s) [] Factory   [x] Dog(s) x 1 indoor small breed [x] Fields   [x] Fish [x] Trees   [] Other [x] Weeds    [x] Water (creek, lake,  pond, river)     Heating your home? Cooling your home?   [x] Electric [x] Central Air Unit   [x] Natural Gas [x] Fan(s)   [] Oil [] Window Unit   [] Propane    [] Wood    [] [employer]/Thermal      Any known allergic reactions to any of the following?   [] Bees   [] Mosquitoes   [] Wasps                 Have you ever been allergy tested in the past?  No    Have you ever had to seek medical treatment in an Emergency Room due to an allergic reaction?  No

## 2018-12-07 PROBLEM — J30.9: Status: ACTIVE | Noted: 2018-12-07

## 2019-07-29 ENCOUNTER — TELEPHONE (OUTPATIENT)
Dept: FAMILY MEDICINE CLINIC | Facility: CLINIC | Age: 34
End: 2019-07-29

## 2019-07-29 RX ORDER — LEVOFLOXACIN 500 MG/1
500 TABLET, FILM COATED ORAL DAILY
Qty: 10 TABLET | Refills: 0 | Status: SHIPPED | OUTPATIENT
Start: 2019-07-29 | End: 2019-08-08

## 2019-07-29 NOTE — TELEPHONE ENCOUNTER
Pt called she has a sinus inf with green yellow mucus she is asking for something to be called in and somehting stronger than amoxil I seen in the past u have done [email or web address]

## 2020-09-11 ENCOUNTER — TELEPHONE (OUTPATIENT)
Dept: FAMILY MEDICINE CLINIC | Facility: CLINIC | Age: 35
End: 2020-09-11

## 2020-09-11 RX ORDER — AMOXICILLIN 500 MG/1
500 TABLET, FILM COATED ORAL 3 TIMES DAILY
Qty: 30 TABLET | Refills: 0 | Status: SHIPPED | OUTPATIENT
Start: 2020-09-11 | End: 2020-09-21

## 2020-09-11 NOTE — TELEPHONE ENCOUNTER
Pt called and siad that she is having major sinus pressure she is taking sudafed and it is starting to ease up and motrin 800mg she wants to know if u will call in anbtx or what eever u suggest she siad before she had to take a large dose of steroids eren [email or web address]

## 2021-01-25 ENCOUNTER — TELEPHONE (OUTPATIENT)
Dept: FAMILY MEDICINE CLINIC | Facility: CLINIC | Age: 36
End: 2021-01-25

## 2021-01-25 ENCOUNTER — OFFICE VISIT (OUTPATIENT)
Dept: FAMILY MEDICINE CLINIC | Facility: CLINIC | Age: 36
End: 2021-01-25

## 2021-01-25 VITALS
HEART RATE: 75 BPM | HEIGHT: 64 IN | WEIGHT: 160 LBS | TEMPERATURE: 98.2 F | SYSTOLIC BLOOD PRESSURE: 122 MMHG | BODY MASS INDEX: 27.31 KG/M2 | RESPIRATION RATE: 16 BRPM | DIASTOLIC BLOOD PRESSURE: 72 MMHG | OXYGEN SATURATION: 99 %

## 2021-01-25 DIAGNOSIS — M26.622 ARTHRALGIA OF LEFT TEMPOROMANDIBULAR JOINT: Primary | ICD-10-CM

## 2021-01-25 PROCEDURE — 99213 OFFICE O/P EST LOW 20 MIN: CPT | Performed by: NURSE PRACTITIONER

## 2021-01-25 RX ORDER — METHYLPREDNISOLONE 4 MG/1
TABLET ORAL
Qty: 1 EACH | Refills: 0 | Status: SHIPPED | OUTPATIENT
Start: 2021-01-25 | End: 2021-10-21

## 2021-01-25 NOTE — PROGRESS NOTES
Subjective   Chief Complaint:  Left ear pain    History of Present Illness:  This 35 y.o. female was seen in the office today.  Left ear pain x 2 days.  No fevers or dental problems.      No Known Allergies   Current Outpatient Medications on File Prior to Visit   Medication Sig   • busPIRone (BUSPAR) 7.5 MG tablet 7.5 mg every night at bedtime.   • fexofenadine (ALLEGRA) 60 MG tablet Take 60 mg by mouth Daily.   • fluticasone (FLONASE) 50 MCG/ACT nasal spray 1 spray into the nostril(s) as directed by provider 2 (Two) Times a Day.   • Pseudoephedrine-Ibuprofen (ADVIL COLD/SINUS PO) Take  by mouth As Needed.   • olopatadine (PATANASE) 0.6 % solution nasal solution 2 sprays by Each Nare route 2 (Two) Times a Day.     No current facility-administered medications on file prior to visit.       Past Medical, Surgical, Social, and Family History:  Past Medical History:   Diagnosis Date   • Allergic rhinitis      Past Surgical History:   Procedure Laterality Date   •  SECTION       Social History     Socioeconomic History   • Marital status: Unknown     Spouse name: Not on file   • Number of children: Not on file   • Years of education: Not on file   • Highest education level: Not on file   Tobacco Use   • Smoking status: Never Smoker   • Smokeless tobacco: Never Used   Substance and Sexual Activity   • Alcohol use: Yes     Comment: rarely   • Drug use: Defer     Family History   Problem Relation Age of Onset   • No Known Problems Mother    • No Known Problems Father      Objective   Physical Exam  Vitals signs reviewed.   Constitutional:       General: She is not in acute distress.     Appearance: Normal appearance.   HENT:      Head:      Jaw: Tenderness (left TMJ with palpable tenderness.) present.      Right Ear: Hearing and tympanic membrane normal.      Left Ear: Hearing and tympanic membrane normal.      Mouth/Throat:      Dentition: No dental tenderness or dental caries.   Cardiovascular:      Rate and  "Rhythm: Normal rate and regular rhythm.   Pulmonary:      Effort: Pulmonary effort is normal.      Breath sounds: Normal breath sounds.     /72 (BP Location: Left arm)   Pulse 75   Temp 98.2 °F (36.8 °C)   Resp 16   Ht 162.6 cm (64\")   Wt 72.6 kg (160 lb)   SpO2 99%   BMI 27.46 kg/m²     Assessment/Plan   Diagnoses and all orders for this visit:    1. Arthralgia of left temporomandibular joint (Primary)  -     methylPREDNISolone (MEDROL) 4 MG dose pack; Take as directed on package instructions.  Dispense: 1 each; Refill: 0    Discussion:  Advised and educated plan of care.  Pain is referring from the TMJ.  Advised conservative measures to reduce inflammation along with MDP, f/u PRN.    Follow-up:  Return if symptoms worsen or fail to improve.    Note e-Signed by JERRY Childs on 01/25/2021 at 15:34 CST  "

## 2021-01-25 NOTE — TELEPHONE ENCOUNTER
PATIENT CALLS       REQUESTING A CALL BACK TO BE ADVISED ABOUT HER EAR PAIN   SHE DID NOT WANT TO SCHEDULE AN APPT UNLESS SHE WAS ADVISED TO BY CLINICAL   STATES THERE IS A KNOT ON THE OUTSIDE OF HER INNER EAR     STATES SHE TRIED OVER THE COUNTER EAR DROP AND FELT THAT THE DROP WAS BLOCKED BY HER EAR      GOOD CONTACT NUMBER   [phone] (F)          STATES IF THERE WILL BE ANY SCRIPTS CALLED IN TO PLEASE SEND TO   [facility]/pharmacy #[id number] - [place], [place] - [address] [phone] [facility] [phone]   [phone]

## 2021-10-21 ENCOUNTER — OFFICE VISIT (OUTPATIENT)
Dept: FAMILY MEDICINE CLINIC | Facility: CLINIC | Age: 36
End: 2021-10-21

## 2021-10-21 ENCOUNTER — HOSPITAL ENCOUNTER (OUTPATIENT)
Dept: GENERAL RADIOLOGY | Facility: HOSPITAL | Age: 36
Discharge: HOME | End: 2021-10-21
Admitting: FAMILY MEDICINE

## 2021-10-21 VITALS
BODY MASS INDEX: 26.63 KG/M2 | OXYGEN SATURATION: 97 % | SYSTOLIC BLOOD PRESSURE: 122 MMHG | WEIGHT: 156 LBS | HEART RATE: 76 BPM | HEIGHT: 64 IN | DIASTOLIC BLOOD PRESSURE: 78 MMHG | RESPIRATION RATE: 16 BRPM

## 2021-10-21 DIAGNOSIS — M79.644 PAIN OF RIGHT THUMB: Primary | ICD-10-CM

## 2021-10-21 PROCEDURE — 73140 X-RAY EXAM OF FINGER(S): CPT

## 2021-10-21 PROCEDURE — 99213 OFFICE O/P EST LOW 20 MIN: CPT | Performed by: FAMILY MEDICINE

## 2021-10-21 RX ORDER — PHENTERMINE HYDROCHLORIDE 37.5 MG/1
TABLET ORAL
COMMUNITY
Start: 2021-10-16 | End: 2023-01-27

## 2021-10-21 RX ORDER — BUSPIRONE HYDROCHLORIDE 10 MG/1
TABLET ORAL
COMMUNITY
Start: 2021-10-10 | End: 2023-01-27

## 2021-10-21 NOTE — PROGRESS NOTES
"Subjective   Jeni Aguilar is a 36 y.o. female.     Chief Complaint   Patient presents with   • Hand Pain     right thumb joint pain & radiating pain in the pad of the thumb        History of Present Illness     right thumb pain for several years and will occ swell      Current Outpatient Medications:   •  busPIRone (BUSPAR) 10 MG tablet, , Disp: , Rfl:   •  busPIRone (BUSPAR) 7.5 MG tablet, 7.5 mg every night at bedtime., Disp: , Rfl:   •  fexofenadine (ALLEGRA) 60 MG tablet, Take 60 mg by mouth Daily., Disp: , Rfl:   •  fluticasone (FLONASE) 50 MCG/ACT nasal spray, 1 spray into the nostril(s) as directed by provider 2 (Two) Times a Day., Disp: 1 bottle, Rfl: 6  •  olopatadine (PATANASE) 0.6 % solution nasal solution, 2 sprays by Each Nare route 2 (Two) Times a Day., Disp: 1 bottle, Rfl: 5  •  phentermine (ADIPEX-P) 37.5 MG tablet, TAKE (1) TABLET DAILY IN THE MORNING. (BEFORE BREAKFAST), Disp: , Rfl:   No Known Allergies    Past Medical History:   Diagnosis Date   • Allergic rhinitis      Past Surgical History:   Procedure Laterality Date   •  SECTION         Review of Systems   Constitutional: Negative.    HENT: Negative.    Eyes: Negative.    Respiratory: Negative.    Cardiovascular: Negative.    Gastrointestinal: Negative.    Endocrine: Negative.    Genitourinary: Negative.    Musculoskeletal: Positive for arthralgias.   Skin: Negative.        Objective  /78   Pulse 76   Resp 16   Ht 162.6 cm (64\")   Wt 70.8 kg (156 lb)   SpO2 97%   BMI 26.78 kg/m²   Physical Exam  Vitals and nursing note reviewed.   Constitutional:       Appearance: Normal appearance.   HENT:      Head: Normocephalic and atraumatic.      Nose: Nose normal.      Mouth/Throat:      Mouth: Mucous membranes are moist.   Eyes:      Extraocular Movements: Extraocular movements intact.      Conjunctiva/sclera: Conjunctivae normal.   Cardiovascular:      Rate and Rhythm: Normal rate.      Pulses: Normal pulses.      Heart " sounds: Normal heart sounds.   Pulmonary:      Effort: Pulmonary effort is normal.      Breath sounds: Normal breath sounds.   Abdominal:      General: Abdomen is flat. Bowel sounds are normal.   Musculoskeletal:         General: Tenderness present.      Cervical back: Normal range of motion.   Skin:     General: Skin is warm and dry.   Neurological:      Mental Status: She is alert.         Assessment/Plan   Diagnoses and all orders for this visit:    1. Pain of right thumb (Primary)  -     CBC & Differential  -     Comprehensive metabolic panel  -     Sedimentation Rate  -     C-reactive protein  -     ALAN  -     Rheumatoid Factor, Quant  -     XR finger 2+ vw right                 Orders Placed This Encounter   Procedures   • XR finger 2+ vw right     Order Specific Question:   Reason for Exam:     Answer:   thumb pain and swelling     Order Specific Question:   Patient Pregnant     Answer:   No     Order Specific Question:   Does this patient have a diabetic monitoring/medication delivering device on?     Answer:   No     Order Specific Question:   Release to patient     Answer:   Immediate   • Comprehensive metabolic panel     Order Specific Question:   Release to patient     Answer:   Immediate   • Sedimentation Rate     Order Specific Question:   Release to patient     Answer:   Immediate   • C-reactive protein     Order Specific Question:   Release to patient     Answer:   Immediate   • ALAN     Order Specific Question:   Release to patient     Answer:   Immediate   • Rheumatoid Factor, Quant     Order Specific Question:   Release to patient     Answer:   Immediate   • CBC & Differential   call for results    Follow up: 8 week(s)

## 2021-10-22 LAB
ALBUMIN SERPL-MCNC: 5.2 G/DL (ref 3.5–5.2)
ALBUMIN/GLOB SERPL: 2.6 G/DL
ALP SERPL-CCNC: 46 U/L (ref 39–117)
ALT SERPL-CCNC: 10 U/L (ref 1–33)
ANA SER QL: NEGATIVE
AST SERPL-CCNC: 12 U/L (ref 1–32)
BASOPHILS # BLD AUTO: 0.04 10*3/MM3 (ref 0–0.2)
BASOPHILS NFR BLD AUTO: 0.7 % (ref 0–1.5)
BILIRUB SERPL-MCNC: 0.4 MG/DL (ref 0–1.2)
BUN SERPL-MCNC: 12 MG/DL (ref 6–20)
BUN/CREAT SERPL: 15.2 (ref 7–25)
CALCIUM SERPL-MCNC: 10 MG/DL (ref 8.6–10.5)
CHLORIDE SERPL-SCNC: 100 MMOL/L (ref 98–107)
CO2 SERPL-SCNC: 27.2 MMOL/L (ref 22–29)
CREAT SERPL-MCNC: 0.79 MG/DL (ref 0.57–1)
CRP SERPL-MCNC: <0.3 MG/DL (ref 0–0.5)
EOSINOPHIL # BLD AUTO: 0.04 10*3/MM3 (ref 0–0.4)
EOSINOPHIL NFR BLD AUTO: 0.7 % (ref 0.3–6.2)
ERYTHROCYTE [DISTWIDTH] IN BLOOD BY AUTOMATED COUNT: 12.7 % (ref 12.3–15.4)
ERYTHROCYTE [SEDIMENTATION RATE] IN BLOOD BY WESTERGREN METHOD: <1 MM/HR (ref 0–20)
GLOBULIN SER CALC-MCNC: 2 GM/DL
GLUCOSE SERPL-MCNC: 84 MG/DL (ref 65–99)
HCT VFR BLD AUTO: 41.3 % (ref 34–46.6)
HGB BLD-MCNC: 13.7 G/DL (ref 12–15.9)
IMM GRANULOCYTES # BLD AUTO: 0.02 10*3/MM3 (ref 0–0.05)
IMM GRANULOCYTES NFR BLD AUTO: 0.4 % (ref 0–0.5)
LYMPHOCYTES # BLD AUTO: 2.05 10*3/MM3 (ref 0.7–3.1)
LYMPHOCYTES NFR BLD AUTO: 37.5 % (ref 19.6–45.3)
MCH RBC QN AUTO: 28.7 PG (ref 26.6–33)
MCHC RBC AUTO-ENTMCNC: 33.2 G/DL (ref 31.5–35.7)
MCV RBC AUTO: 86.6 FL (ref 79–97)
MONOCYTES # BLD AUTO: 0.5 10*3/MM3 (ref 0.1–0.9)
MONOCYTES NFR BLD AUTO: 9.2 % (ref 5–12)
NEUTROPHILS # BLD AUTO: 2.81 10*3/MM3 (ref 1.7–7)
NEUTROPHILS NFR BLD AUTO: 51.5 % (ref 42.7–76)
NRBC BLD AUTO-RTO: 0 /100 WBC (ref 0–0.2)
PLATELET # BLD AUTO: 315 10*3/MM3 (ref 140–450)
POTASSIUM SERPL-SCNC: 4.2 MMOL/L (ref 3.5–5.2)
PROT SERPL-MCNC: 7.2 G/DL (ref 6–8.5)
RBC # BLD AUTO: 4.77 10*6/MM3 (ref 3.77–5.28)
RHEUMATOID FACT SERPL-ACNC: <10 IU/ML (ref 0–13.9)
SODIUM SERPL-SCNC: 141 MMOL/L (ref 136–145)
WBC # BLD AUTO: 5.46 10*3/MM3 (ref 3.4–10.8)

## 2022-06-10 ENCOUNTER — OFFICE VISIT (OUTPATIENT)
Dept: FAMILY MEDICINE CLINIC | Facility: CLINIC | Age: 37
End: 2022-06-10

## 2022-06-10 VITALS
HEIGHT: 64 IN | OXYGEN SATURATION: 97 % | WEIGHT: 159 LBS | BODY MASS INDEX: 27.14 KG/M2 | SYSTOLIC BLOOD PRESSURE: 118 MMHG | HEART RATE: 72 BPM | DIASTOLIC BLOOD PRESSURE: 68 MMHG

## 2022-06-10 DIAGNOSIS — M67.431 GANGLION CYST OF WRIST, RIGHT: Primary | ICD-10-CM

## 2022-06-10 DIAGNOSIS — K21.9 GASTROESOPHAGEAL REFLUX DISEASE, UNSPECIFIED WHETHER ESOPHAGITIS PRESENT: ICD-10-CM

## 2022-06-10 PROCEDURE — 99213 OFFICE O/P EST LOW 20 MIN: CPT | Performed by: FAMILY MEDICINE

## 2022-06-10 RX ORDER — PANTOPRAZOLE SODIUM 40 MG/1
40 TABLET, DELAYED RELEASE ORAL DAILY
Qty: 45 TABLET | Refills: 3 | Status: SHIPPED | OUTPATIENT
Start: 2022-06-10 | End: 2022-06-24 | Stop reason: SDUPTHER

## 2022-06-10 NOTE — PROGRESS NOTES
"Subjective   Jeni Aguilar is a 36 y.o. female.     Chief Complaint   Patient presents with   • Hand Pain   • Heartburn        History of Present Illness     sheis noting issues with a cystic strucutre apperaring on the right wrist--it apparesd a few weeks ago--she is noting issues with gerd ===she used to take protonix      Current Outpatient Medications:   •  busPIRone (BUSPAR) 7.5 MG tablet, 7.5 mg 2 (Two) Times a Day., Disp: , Rfl:   •  fexofenadine (ALLEGRA) 60 MG tablet, Take 60 mg by mouth Daily., Disp: , Rfl:   •  fluticasone (FLONASE) 50 MCG/ACT nasal spray, 1 spray into the nostril(s) as directed by provider 2 (Two) Times a Day., Disp: 1 bottle, Rfl: 6  •  olopatadine (PATANASE) 0.6 % solution nasal solution, 2 sprays by Each Nare route 2 (Two) Times a Day., Disp: 1 bottle, Rfl: 5  •  phentermine (ADIPEX-P) 37.5 MG tablet, TAKE (1) TABLET DAILY IN THE MORNING. (BEFORE BREAKFAST), Disp: , Rfl:   •  busPIRone (BUSPAR) 10 MG tablet, , Disp: , Rfl:   •  pantoprazole (PROTONIX) 40 MG EC tablet, Take 1 tablet by mouth Daily., Disp: 45 tablet, Rfl: 3  •  sertraline (ZOLOFT) 50 MG tablet, Take 50 mg by mouth Daily., Disp: , Rfl:   No Known Allergies    BMI is >= 25 and <30. (Overweight) The following options were offered after discussion;: nutrition counseling/recommendations      Past Medical History:   Diagnosis Date   • Allergic rhinitis      Past Surgical History:   Procedure Laterality Date   •  SECTION         Review of Systems   Constitutional: Negative.    HENT: Negative.    Eyes: Negative.    Respiratory: Negative.    Cardiovascular: Negative.    Gastrointestinal: Negative.    Endocrine: Negative.    Genitourinary: Negative.    Musculoskeletal: Negative.    Skin: Negative.    Allergic/Immunologic: Negative.    Neurological: Negative.    Hematological: Negative.    Psychiatric/Behavioral: Negative.        Objective  /68   Pulse 72   Ht 162.6 cm (64\")   Wt 72.1 kg (159 lb)   SpO2 97% "   BMI 27.29 kg/m²   Physical Exam  Vitals and nursing note reviewed.   Constitutional:       Appearance: Normal appearance. She is normal weight.   HENT:      Head: Normocephalic and atraumatic.      Nose: Nose normal.      Mouth/Throat:      Mouth: Mucous membranes are moist.   Eyes:      Extraocular Movements: Extraocular movements intact.      Conjunctiva/sclera: Conjunctivae normal.      Pupils: Pupils are equal, round, and reactive to light.   Cardiovascular:      Rate and Rhythm: Normal rate and regular rhythm.      Pulses: Normal pulses.      Heart sounds: Normal heart sounds.   Pulmonary:      Effort: Pulmonary effort is normal.      Breath sounds: Normal breath sounds.   Abdominal:      General: Abdomen is flat. Bowel sounds are normal.      Palpations: Abdomen is soft.   Musculoskeletal:         General: Tenderness present.      Cervical back: Normal range of motion and neck supple.   Skin:     General: Skin is warm and dry.      Capillary Refill: Capillary refill takes less than 2 seconds.   Neurological:      General: No focal deficit present.      Mental Status: She is alert and oriented to person, place, and time. Mental status is at baseline.   Psychiatric:         Mood and Affect: Mood normal.         Behavior: Behavior normal.         Thought Content: Thought content normal.         Judgment: Judgment normal.     noted cystic structure near wrist    Assessment & Plan   Diagnoses and all orders for this visit:    1. Ganglion cyst of wrist, right (Primary)  -     Ambulatory Referral to Orthopedic Surgery    2. Gastroesophageal reflux disease, unspecified whether esophagitis present    Other orders  -     pantoprazole (PROTONIX) 40 MG EC tablet; Take 1 tablet by mouth Daily.  Dispense: 45 tablet; Refill: 3                 Orders Placed This Encounter   Procedures   • Ambulatory Referral to Orthopedic Surgery     Referral Priority:   Routine     Referral Type:   Consultation     Referral Reason:    Specialty Services Required     Requested Specialty:   Orthopedic Surgery     Number of Visits Requested:   1       Follow up: 6 month(s)

## 2022-06-24 ENCOUNTER — TELEPHONE (OUTPATIENT)
Dept: FAMILY MEDICINE CLINIC | Facility: CLINIC | Age: 37
End: 2022-06-24

## 2022-06-24 RX ORDER — PANTOPRAZOLE SODIUM 40 MG/1
40 TABLET, DELAYED RELEASE ORAL 2 TIMES DAILY
Qty: 60 TABLET | Refills: 0 | Status: SHIPPED | OUTPATIENT
Start: 2022-06-24 | End: 2022-08-05

## 2022-06-24 NOTE — TELEPHONE ENCOUNTER
Caller: Jeff Jeni RAVI    Relationship: Self    Best call back number: [phone]        Who are you requesting to speak with (clinical staff, provider,  specific staff member): CLINICAL STAFF    Do you know the name of the person who called: PATIENT    What was the call regarding:PATIENT IS TAKING 1 PROTONIX, 40 MG AND SHE IS MISERABLE. CAN SHE TAKE 2 40 MG TABLETS? PATIENT IS BARELY EATING AND SITTING UP IN RECLINER TO SLEEP    Do you require a callback: YES      [facility] - [place], [place] - [address] [place] [address] - [phone] [facility] [phone] FX

## 2022-08-05 RX ORDER — PANTOPRAZOLE SODIUM 40 MG/1
TABLET, DELAYED RELEASE ORAL
Qty: 60 TABLET | Refills: 0 | Status: SHIPPED | OUTPATIENT
Start: 2022-08-05 | End: 2023-02-09 | Stop reason: SDUPTHER

## 2022-08-05 NOTE — TELEPHONE ENCOUNTER
Rx Refill Note  Requested Prescriptions     Pending Prescriptions Disp Refills   • pantoprazole (PROTONIX) 40 MG EC tablet [Pharmacy Med Name: PANTOPRAZOLE SODIUM 40MG TABLET DELAYED RELEASE] 60 tablet 0     Sig: TAKE 1 TABLET BY MOUTH TWICE DAILY.      Last office visit with prescribing clinician: 6/10/2022      Next office visit with prescribing clinician: 12/16/2022            Josefina Kohler, PCT  08/05/22, 12:48 CDT  
all other ROS negative except as per HPI

## 2022-09-13 ENCOUNTER — TELEPHONE (OUTPATIENT)
Dept: FAMILY MEDICINE CLINIC | Facility: CLINIC | Age: 37
End: 2022-09-13

## 2022-09-13 RX ORDER — AMOXICILLIN AND CLAVULANATE POTASSIUM 875; 125 MG/1; MG/1
1 TABLET, FILM COATED ORAL 2 TIMES DAILY
Qty: 20 TABLET | Refills: 0 | Status: SHIPPED | OUTPATIENT
Start: 2022-09-13 | End: 2023-01-12

## 2022-09-13 RX ORDER — METHYLPREDNISOLONE 4 MG/1
TABLET ORAL
Qty: 21 TABLET | Refills: 0 | Status: SHIPPED | OUTPATIENT
Start: 2022-09-13 | End: 2023-01-12

## 2022-09-13 NOTE — TELEPHONE ENCOUNTER
----- Message from Yadira Garcia MA sent at 9/13/2022  8:43 AM CDT -----  Regarding: FW: Sinus infection     ----- Message -----  From: Jeni Aguilar  Sent: 9/13/2022   8:27 AM CDT  To: [facility] [place] [facility]  Subject: Sinus infection                                  Hello it's that time of year again for me unfortunately.  I have a sinus infection and was hoping you could call out an antibiotic and steroid to [facility].      augmentin 875mb bid x 10 days and mdp

## 2022-10-21 ENCOUNTER — TELEPHONE (OUTPATIENT)
Dept: FAMILY MEDICINE CLINIC | Facility: CLINIC | Age: 37
End: 2022-10-21

## 2022-10-21 RX ORDER — OSELTAMIVIR PHOSPHATE 75 MG/1
75 CAPSULE ORAL DAILY
Qty: 10 CAPSULE | Refills: 0 | Status: SHIPPED | OUTPATIENT
Start: 2022-10-21 | End: 2022-10-31

## 2022-10-21 NOTE — TELEPHONE ENCOUNTER
Telephone: Reports exposure to influenza and household-confirmed influenza A-preventative Tamiflu sent with instructions that if becomes symptomatic to change from daily dosing to twice a day dosing for the remainder of pack advised to follow-up in clinic as needed.    Electronically signed by JERRY Childs, 10/21/22, 3:52 PM CDT.

## 2022-12-19 ENCOUNTER — TELEPHONE (OUTPATIENT)
Dept: FAMILY MEDICINE CLINIC | Facility: CLINIC | Age: 37
End: 2022-12-19

## 2022-12-19 RX ORDER — AMOXICILLIN AND CLAVULANATE POTASSIUM 875; 125 MG/1; MG/1
1 TABLET, FILM COATED ORAL 2 TIMES DAILY
Qty: 20 TABLET | Refills: 0 | Status: SHIPPED | OUTPATIENT
Start: 2022-12-19 | End: 2023-01-12

## 2022-12-19 NOTE — TELEPHONE ENCOUNTER
Caller: JeffJeni    Relationship: Self    Best call back number: [phone]    What medication are you requesting:EAR DROPS,  ANTIBIOTIC    What are your current symptoms:  THE PATIENT STATES THAT SHE IS COUGHING UP GREEN PHLEGM, BLOWING OUT GREEN MUCUS AND HER LEFT EAR FEELS MUFFLED AND FULL    How long have you been experiencing symptoms: 12/16/22    Have you had these symptoms before:    [] Yes  [x] No    Have you been treated for these symptoms before:   [] Yes  [x] No    If a prescription is needed, what is your preferred pharmacy and phone number: [facility] - [place], [place] - [address] [place] [address] - [phone] [facility] [phone] FX     Additional notes:

## 2022-12-20 ENCOUNTER — TELEPHONE (OUTPATIENT)
Dept: FAMILY MEDICINE CLINIC | Facility: CLINIC | Age: 37
End: 2022-12-20

## 2022-12-20 RX ORDER — METHYLPREDNISOLONE 4 MG/1
TABLET ORAL
Qty: 21 TABLET | Refills: 0 | Status: SHIPPED | OUTPATIENT
Start: 2022-12-20 | End: 2023-01-12

## 2022-12-28 ENCOUNTER — TELEPHONE (OUTPATIENT)
Dept: FAMILY MEDICINE CLINIC | Facility: CLINIC | Age: 37
End: 2022-12-28

## 2022-12-28 RX ORDER — LEVOFLOXACIN 500 MG/1
500 TABLET, FILM COATED ORAL DAILY
Qty: 7 TABLET | Refills: 0 | Status: SHIPPED | OUTPATIENT
Start: 2022-12-28 | End: 2023-01-12

## 2022-12-28 NOTE — TELEPHONE ENCOUNTER
Caller: Jeni Aguilar    Relationship: Self    Best call back number:  [phone]      What medication are you requesting: different abx     What are your current symptoms:  Still experiencing symptoms; has 1 dose left of current abx and is still having a lot of green/yellow-gracie drainage and coughing up mucus with color as well     How long have you been experiencing symptoms:  Ongoing 7+ days     If a prescription is needed, what is your preferred pharmacy and phone number: [facility] [place] [facility] [address] [place] [address] - [phone] [facility] [phone] FX     Additional notes:

## 2023-01-12 ENCOUNTER — OFFICE VISIT (OUTPATIENT)
Dept: FAMILY MEDICINE CLINIC | Facility: CLINIC | Age: 38
End: 2023-01-12
Payer: COMMERCIAL

## 2023-01-12 ENCOUNTER — HOSPITAL ENCOUNTER (OUTPATIENT)
Dept: GENERAL RADIOLOGY | Facility: HOSPITAL | Age: 38
Discharge: HOME | End: 2023-01-12
Admitting: NURSE PRACTITIONER
Payer: COMMERCIAL

## 2023-01-12 VITALS
HEIGHT: 64 IN | BODY MASS INDEX: 31.41 KG/M2 | TEMPERATURE: 97.3 F | WEIGHT: 184 LBS | SYSTOLIC BLOOD PRESSURE: 132 MMHG | OXYGEN SATURATION: 99 % | HEART RATE: 111 BPM | RESPIRATION RATE: 18 BRPM | DIASTOLIC BLOOD PRESSURE: 86 MMHG

## 2023-01-12 DIAGNOSIS — R07.89 ATYPICAL CHEST PAIN: Primary | ICD-10-CM

## 2023-01-12 DIAGNOSIS — R07.89 ATYPICAL CHEST PAIN: ICD-10-CM

## 2023-01-12 PROCEDURE — 99213 OFFICE O/P EST LOW 20 MIN: CPT | Performed by: NURSE PRACTITIONER

## 2023-01-12 PROCEDURE — 71110 X-RAY EXAM RIBS BIL 3 VIEWS: CPT

## 2023-01-12 PROCEDURE — 71046 X-RAY EXAM CHEST 2 VIEWS: CPT

## 2023-01-12 RX ORDER — KETOROLAC TROMETHAMINE 10 MG/1
10 TABLET, FILM COATED ORAL EVERY 6 HOURS PRN
Qty: 20 TABLET | Refills: 0 | Status: SHIPPED | OUTPATIENT
Start: 2023-01-12 | End: 2023-01-27

## 2023-01-12 RX ORDER — FLUCONAZOLE 150 MG/1
TABLET ORAL
COMMUNITY
Start: 2023-01-02 | End: 2023-01-27

## 2023-01-12 RX ORDER — CYCLOBENZAPRINE HCL 10 MG
10 TABLET ORAL NIGHTLY PRN
COMMUNITY
Start: 2023-01-02 | End: 2023-01-12

## 2023-01-12 RX ORDER — TIZANIDINE 4 MG/1
4 TABLET ORAL NIGHTLY PRN
Qty: 20 TABLET | Refills: 0 | Status: SHIPPED | OUTPATIENT
Start: 2023-01-12 | End: 2023-01-27

## 2023-01-12 RX ORDER — BENZONATATE 100 MG/1
CAPSULE ORAL
COMMUNITY
Start: 2023-01-02 | End: 2023-01-27

## 2023-01-12 NOTE — PROGRESS NOTES
Reviewed results - [employer] message sent.  If not seen in 3 days (3 day alert set), will send to pool to call the message.      Electronically signed by JERRY Childs, 01/12/23, 12:37 PM CST.

## 2023-01-12 NOTE — PROGRESS NOTES
Subjective   Chief Complaint:  Back pain    History of Present Illness:  This 37 y.o. female was seen in the office today. Ms. Aguilar reports that she had a sinus infection, which is normal for her. She states that she continued to cough and took amoxicillin for 10 days, as well as a steroid. She states that on 01/09/2023, she felt a pulling or popping sensation on the right side of her back. She presented to urgent care and was informed that her lungs sounded good. The patient reports that she was prescribed muscle relaxers. She states that she has been doing better. The patient reports that this morning, 01/12/2023, she coughed and her left side popped. She states that she is unsure if it is spasms or if she has broken it. The patient reports that she took 800 mg of ibuprofen this morning. She states that she took the muscle relaxer when this happened and it was bearable. The patient reports that she called her mother who brought her to the office. She states that she has taken Tylenol, but ibuprofen gives her more relief. The patient reports that she is not out of muscle relaxers. She states that she did not take them this past weekend while she was working, but she had to take them every night and then all weekend to function.    The patient reports that she had pneumonia approximately 23 years ago.    No Known Allergies   Current Outpatient Medications on File Prior to Visit   Medication Sig   • benzonatate (TESSALON) 100 MG capsule    • busPIRone (BUSPAR) 10 MG tablet    • busPIRone (BUSPAR) 7.5 MG tablet 7.5 mg 2 (Two) Times a Day.   • fexofenadine (ALLEGRA) 60 MG tablet Take 60 mg by mouth Daily.   • fluconazole (DIFLUCAN) 150 MG tablet TAKE 1 TABLET BY MOUTH EVERY 72 HOURS FOR 9 DAYS   • fluticasone (FLONASE) 50 MCG/ACT nasal spray 1 spray into the nostril(s) as directed by provider 2 (Two) Times a Day.   • olopatadine (PATANASE) 0.6 % solution nasal solution 2 sprays by Each Nare route 2 (Two) Times a  Day.   • pantoprazole (PROTONIX) 40 MG EC tablet TAKE 1 TABLET BY MOUTH TWICE DAILY.   • phentermine (ADIPEX-P) 37.5 MG tablet TAKE (1) TABLET DAILY IN THE MORNING. (BEFORE BREAKFAST)   • sertraline (ZOLOFT) 50 MG tablet Take 50 mg by mouth Daily.   • [DISCONTINUED] cyclobenzaprine (FLEXERIL) 10 MG tablet Take 10 mg by mouth At Night As Needed.   • [DISCONTINUED] amoxicillin-clavulanate (Augmentin) 875-125 MG per tablet Take 1 tablet by mouth 2 (Two) Times a Day.   • [DISCONTINUED] amoxicillin-clavulanate (Augmentin) 875-125 MG per tablet Take 1 tablet by mouth 2 (Two) Times a Day.   • [DISCONTINUED] levoFLOXacin (Levaquin) 500 MG tablet Take 1 tablet by mouth Daily.   • [DISCONTINUED] methylPREDNISolone (MEDROL) 4 MG dose pack Take as directed on package instructions.   • [DISCONTINUED] methylPREDNISolone (MEDROL) 4 MG dose pack Take as directed on package instructions.     No current facility-administered medications on file prior to visit.      Past Medical, Surgical, Social, and Family History:  Past Medical History:   Diagnosis Date   • Allergic rhinitis      Past Surgical History:   Procedure Laterality Date   •  SECTION       Social History     Socioeconomic History   • Marital status:    Tobacco Use   • Smoking status: Never   • Smokeless tobacco: Never   Substance and Sexual Activity   • Alcohol use: Yes     Comment: rarely   • Drug use: Defer     Family History   Problem Relation Age of Onset   • No Known Problems Mother    • No Known Problems Father        Prior Visit Notes/Records, Lab, Imaging, and Diagnostic Results Reviewed:  A1C:No results for input(s): HGBA1C in the last 30056 hours.  GLUCOSE:  Lab Results - Last 18 Months   Lab Units 10/21/21  1053   GLUCOSE mg/dL 84     LIPID:No results for input(s): CHLPL, LDL, HDL, TRIG in the last 80537 hours.  PSA:No results for input(s): PSA in the last 30201 hours.  CBC:  Lab Results - Last 18 Months   Lab Units 10/21/21  1053   WBC 10*3/mm3  "5.46   HEMOGLOBIN g/dL 13.7   HEMATOCRIT % 41.3   PLATELETS 10*3/mm3 315      BMP/CMP:  Lab Results - Last 18 Months   Lab Units 10/21/21  1053   SODIUM mmol/L 141   POTASSIUM mmol/L 4.2   CHLORIDE mmol/L 100   TOTAL CO2 mmol/L 27.2   GLUCOSE mg/dL 84   BUN mg/dL 12   CREATININE mg/dL 0.79   EGFR IF NONAFRICN AM mL/min/1.73 82   EGFR IF AFRICN AM mL/min/1.73 99   CALCIUM mg/dL 10.0     HEPATIC:  Lab Results - Last 18 Months   Lab Units 10/21/21  1053   ALT (SGPT) U/L 10   AST (SGOT) U/L 12   ALK PHOS U/L 46     Vit D:No results for input(s): GNOB34MY in the last 61914 hours.  THYROID:No results for input(s): TSH, FREET4, FTI in the last 14791 hours.    Invalid input(s): FREET3, T3, T4, TEUP,  TOTALT4    Objective   Physical Exam  Vitals reviewed.   Constitutional:       General: She is not in acute distress.     Appearance: Normal appearance.   Cardiovascular:      Rate and Rhythm: Normal rate and regular rhythm.   Pulmonary:      Effort: Pulmonary effort is normal.      Breath sounds: Normal breath sounds.     /86 (BP Location: Left arm, Patient Position: Sitting, Cuff Size: Adult)   Pulse 111   Temp 97.3 °F (36.3 °C) (Infrared)   Resp 18   Ht 162.6 cm (64\")   Wt 83.5 kg (184 lb)   SpO2 99%   BMI 31.58 kg/m²     Assessment & Plan   Diagnoses and all orders for this visit:    1. Atypical chest pain (Primary)  -     XR Ribs Bilateral 3 View (In Office); Future  -     XR Chest PA & Lateral; Future    Other orders  -     ketorolac (TORADOL) 10 MG tablet; Take 1 tablet by mouth Every 6 (Six) Hours As Needed for Moderate Pain.  Dispense: 20 tablet; Refill: 0  -     tiZANidine (Zanaflex) 4 MG tablet; Take 1 tablet by mouth At Night As Needed for Muscle Spasms.  Dispense: 20 tablet; Refill: 0    Discussion:  Advised and educated plan of care. The patient was prescribed Toradol. She was advised not to mix the Toradol with Motrin. The patient was advised to get her x-rays done. If her symptoms do not improve, " the next step would be a chest CT scan.    Follow-up:  Return for follow-up as needed pending results - we will call.    Electronically signed by George Cao, 01/12/23, 1:25 PM CST.    Transcribed from ambient dictation for JERRY Childs by Janie Jessica.  01/12/23   13:27 CST    Patient or patient representative verbalized consent to the visit recording.  I have personally performed the services described in this document as transcribed by the above individual, and it is both accurate and complete.

## 2023-01-27 ENCOUNTER — OFFICE VISIT (OUTPATIENT)
Dept: FAMILY MEDICINE CLINIC | Facility: CLINIC | Age: 38
End: 2023-01-27
Payer: COMMERCIAL

## 2023-01-27 VITALS
HEART RATE: 88 BPM | SYSTOLIC BLOOD PRESSURE: 152 MMHG | OXYGEN SATURATION: 96 % | DIASTOLIC BLOOD PRESSURE: 90 MMHG | WEIGHT: 180 LBS | BODY MASS INDEX: 30.73 KG/M2 | HEIGHT: 64 IN

## 2023-01-27 DIAGNOSIS — R07.89 CHEST WALL PAIN: ICD-10-CM

## 2023-01-27 DIAGNOSIS — R12 HEARTBURN: Primary | ICD-10-CM

## 2023-01-27 PROCEDURE — 99213 OFFICE O/P EST LOW 20 MIN: CPT | Performed by: FAMILY MEDICINE

## 2023-01-27 NOTE — PROGRESS NOTES
"Subjective   Jeni Aguilar is a 37 y.o. female.     Chief Complaint   Patient presents with   • Heartburn   • Cough   • Chest Pain     Chest wall pain wrapping to the back        History of Present Illness     she noes heartburn is controlled with protonix --denies any dysphgia--she recently was seen fin [facility] for chst wall pain after a \"hard cough\"  She noes pain with movement     Current Outpatient Medications:   •  fexofenadine (ALLEGRA) 60 MG tablet, Take 60 mg by mouth Daily., Disp: , Rfl:   •  fluticasone (FLONASE) 50 MCG/ACT nasal spray, 1 spray into the nostril(s) as directed by provider 2 (Two) Times a Day., Disp: 1 bottle, Rfl: 6  •  pantoprazole (PROTONIX) 40 MG EC tablet, TAKE 1 TABLET BY MOUTH TWICE DAILY., Disp: 60 tablet, Rfl: 0  •  sertraline (ZOLOFT) 50 MG tablet, Take 50 mg by mouth Daily., Disp: , Rfl:   No Known Allergies    BMI is >= 30 and <35. (Class 1 Obesity). The following options were offered after discussion;: nutrition counseling/recommendations      Past Medical History:   Diagnosis Date   • Allergic rhinitis      Past Surgical History:   Procedure Laterality Date   •  SECTION         Review of Systems   Constitutional: Negative.    HENT: Negative.    Eyes: Negative.    Respiratory: Negative.    Gastrointestinal: Negative.    Endocrine: Negative.    Genitourinary: Negative.    Musculoskeletal: Negative.    Skin: Negative.    Allergic/Immunologic: Negative.    Neurological: Negative.    Hematological: Negative.    Psychiatric/Behavioral: Negative.        Objective  /90   Pulse 88   Ht 162.6 cm (64\")   Wt 81.6 kg (180 lb)   SpO2 96%   BMI 30.90 kg/m²   Physical Exam  Constitutional:       Appearance: Normal appearance. She is normal weight.   HENT:      Head: Normocephalic and atraumatic.      Nose: Nose normal.      Mouth/Throat:      Mouth: Mucous membranes are moist.   Eyes:      Extraocular Movements: Extraocular movements intact.      " Conjunctiva/sclera: Conjunctivae normal.      Pupils: Pupils are equal, round, and reactive to light.   Cardiovascular:      Rate and Rhythm: Normal rate and regular rhythm.      Pulses: Normal pulses.      Heart sounds: Normal heart sounds.   Pulmonary:      Effort: Pulmonary effort is normal.      Breath sounds: Normal breath sounds.   Abdominal:      General: Abdomen is flat. Bowel sounds are normal.      Palpations: Abdomen is soft.   Musculoskeletal:         General: Normal range of motion.      Cervical back: Normal range of motion and neck supple.   Skin:     General: Skin is warm and dry.      Capillary Refill: Capillary refill takes less than 2 seconds.   Neurological:      General: No focal deficit present.      Mental Status: She is alert and oriented to person, place, and time. Mental status is at baseline.   Psychiatric:         Mood and Affect: Mood normal.         Behavior: Behavior normal.         Thought Content: Thought content normal.         Judgment: Judgment normal.         Assessment & Plan   Diagnoses and all orders for this visit:    1. Heartburn (Primary)    2. Chest wall pain        I think her pain is clearly musculoskeleteal  im glad she is better          No orders of the defined types were placed in this encounter.      Follow up: 6 month(s)

## 2023-02-09 RX ORDER — PANTOPRAZOLE SODIUM 40 MG/1
40 TABLET, DELAYED RELEASE ORAL 2 TIMES DAILY
Qty: 60 TABLET | Refills: 0 | Status: SHIPPED | OUTPATIENT
Start: 2023-02-09 | End: 2023-04-05 | Stop reason: SDUPTHER

## 2023-04-05 RX ORDER — PANTOPRAZOLE SODIUM 40 MG/1
40 TABLET, DELAYED RELEASE ORAL 2 TIMES DAILY
Qty: 60 TABLET | Refills: 0 | Status: SHIPPED | OUTPATIENT
Start: 2023-04-05

## 2023-06-02 RX ORDER — PANTOPRAZOLE SODIUM 40 MG/1
40 TABLET, DELAYED RELEASE ORAL 2 TIMES DAILY
Qty: 60 TABLET | Refills: 0 | Status: SHIPPED | OUTPATIENT
Start: 2023-06-02

## 2023-09-15 RX ORDER — PANTOPRAZOLE SODIUM 40 MG/1
40 TABLET, DELAYED RELEASE ORAL 2 TIMES DAILY
Qty: 60 TABLET | Refills: 0 | Status: SHIPPED | OUTPATIENT
Start: 2023-09-15

## 2024-03-21 PROCEDURE — G0123 SCREEN CERV/VAG THIN LAYER: HCPCS | Performed by: OBSTETRICS & GYNECOLOGY

## 2024-03-21 PROCEDURE — 87624 HPV HI-RISK TYP POOLED RSLT: CPT | Performed by: OBSTETRICS & GYNECOLOGY

## 2024-03-22 ENCOUNTER — LAB REQUISITION (OUTPATIENT)
Dept: LAB | Facility: HOSPITAL | Age: 39
End: 2024-03-22
Payer: COMMERCIAL

## 2024-03-22 DIAGNOSIS — Z11.51 ENCOUNTER FOR SCREENING FOR HUMAN PAPILLOMAVIRUS (HPV): ICD-10-CM

## 2024-03-22 DIAGNOSIS — Z01.419 ENCOUNTER FOR GYNECOLOGICAL EXAMINATION (GENERAL) (ROUTINE) WITHOUT ABNORMAL FINDINGS: ICD-10-CM

## 2024-03-22 LAB
GEN CATEG CVX/VAG CYTO-IMP: NORMAL
HPV I/H RISK 4 DNA CVX QL PROBE+SIG AMP: NOT DETECTED
LAB AP CASE REPORT: NORMAL
LAB AP GYN ADDITIONAL INFORMATION: NORMAL
LAB AP GYN OTHER FINDINGS: NORMAL
Lab: NORMAL
PATH INTERP SPEC-IMP: NORMAL
STAT OF ADQ CVX/VAG CYTO-IMP: NORMAL

## 2024-04-13 ENCOUNTER — TRANSCRIBE ORDERS (OUTPATIENT)
Dept: ADMINISTRATIVE | Facility: HOSPITAL | Age: 39
End: 2024-04-13
Payer: COMMERCIAL

## 2024-04-13 DIAGNOSIS — M54.12 RADICULOPATHY OF CERVICAL SPINE: Primary | ICD-10-CM

## 2024-07-26 ENCOUNTER — HOSPITAL ENCOUNTER (OUTPATIENT)
Dept: MRI IMAGING | Facility: HOSPITAL | Age: 39
Discharge: HOME | End: 2024-07-26
Admitting: PHYSICIAN ASSISTANT
Payer: COMMERCIAL

## 2024-07-26 DIAGNOSIS — M54.12 RADICULOPATHY OF CERVICAL SPINE: ICD-10-CM

## 2024-07-26 PROCEDURE — 72141 MRI NECK SPINE W/O DYE: CPT

## 2024-09-10 ENCOUNTER — OFFICE VISIT (OUTPATIENT)
Dept: NEUROSURGERY | Facility: CLINIC | Age: 39
End: 2024-09-10
Payer: COMMERCIAL

## 2024-09-10 VITALS — WEIGHT: 176.6 LBS | HEIGHT: 64 IN | BODY MASS INDEX: 30.15 KG/M2

## 2024-09-10 DIAGNOSIS — R20.0 NUMBNESS AND TINGLING OF BOTH UPPER EXTREMITIES: ICD-10-CM

## 2024-09-10 DIAGNOSIS — R20.2 NUMBNESS AND TINGLING OF BOTH UPPER EXTREMITIES: ICD-10-CM

## 2024-09-10 DIAGNOSIS — M54.2 CERVICALGIA: Primary | ICD-10-CM

## 2024-09-10 DIAGNOSIS — G93.5 CHIARI I MALFORMATION: ICD-10-CM

## 2024-09-10 DIAGNOSIS — E66.09 CLASS 1 OBESITY DUE TO EXCESS CALORIES WITHOUT SERIOUS COMORBIDITY WITH BODY MASS INDEX (BMI) OF 30.0 TO 30.9 IN ADULT: ICD-10-CM

## 2024-09-10 PROCEDURE — 99214 OFFICE O/P EST MOD 30 MIN: CPT | Performed by: NURSE PRACTITIONER

## 2024-09-10 RX ORDER — PHENTERMINE HYDROCHLORIDE 37.5 MG/1
TABLET ORAL
COMMUNITY
Start: 2024-08-01

## 2024-09-10 RX ORDER — CYCLOBENZAPRINE HCL 10 MG
10 TABLET ORAL NIGHTLY
Qty: 30 TABLET | Refills: 0 | Status: SHIPPED | OUTPATIENT
Start: 2024-09-10

## 2024-09-10 RX ORDER — TOPIRAMATE 25 MG/1
TABLET, FILM COATED ORAL
COMMUNITY
Start: 2024-08-01

## 2024-09-10 RX ORDER — MELOXICAM 15 MG/1
15 TABLET ORAL DAILY
Qty: 30 TABLET | Refills: 0 | Status: SHIPPED | OUTPATIENT
Start: 2024-09-10

## 2024-09-10 RX ORDER — BUSPIRONE HYDROCHLORIDE 7.5 MG/1
TABLET ORAL EVERY 12 HOURS SCHEDULED
COMMUNITY

## 2024-09-10 NOTE — PROGRESS NOTES
Primary Care Provider: Salena Min PA    Chief Complaint:   Chief Complaint   Patient presents with    Neck Pain     PT is here with complaints of neck pain and bilateral hand numbness.     History of Present Illness    HISTORY/ HPI:  Jeni Aguilar is a 39 y.o.  female who present today with a complaint of neck pain and bilateral hand numbness and tingling, 75% arms, 25% neck.  No previous spine surgeries.  No known injury.    Intermittent flareups of neck pain over the past 2-3 years.  No significant neck pain present, primarily stiffness it is more noticeable in the fall and winter months.  She additionally endorses persistent pain at the left AC that extends in an ascending fashion throughout the left bicep, as well as persistent numbness and tingling to the bilateral hands in a stocking and glove distribution from the elbows resulting in a progressive decline in fine motor skills and frequently dropped items.  Her symptoms are most notable with physical activity which includes repetitive motion, lifting, and often wakes her from sleep.  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  Minimal alleviation of her symptoms with avoidance and use of either OTC ibuprofen or Aleve.  She denies unsteadiness while ambulating or falls.  She denies occipital headaches, visual disturbances, dizziness, or difficulty swallowing.  She does endorse persistent tinnitus.  Ms. Aguilar additionally denies fevers, chills, night sweats, unexplained weight loss, saddle anesthesia, or bowel or bladder dysfunction. She currently rates the severity of her symptoms 2/10.       Ms. Aguilar has not completed a dedicated course of physician directed physical therapy, massage care, chiropractic care, nor been evaluated by pain management.    ROS:  Review of Systems   Constitutional: Negative.    HENT: Negative.  Positive for dental problem and tinnitus.    Eyes: Negative.  Negative for  "visual disturbance.   Respiratory: Negative.     Cardiovascular: Negative.    Gastrointestinal: Negative.    Endocrine: Negative.    Genitourinary: Negative.    Musculoskeletal: Negative.  Positive for neck pain and neck stiffness.   Skin: Negative.    Allergic/Immunologic: Negative.    Neurological: Negative.  Positive for weakness and numbness. Negative for dizziness, tremors, seizures, syncope, facial asymmetry, speech difficulty, light-headedness and headaches.   Hematological: Negative.    Psychiatric/Behavioral: Negative.     All other systems reviewed and are negative.    Past Medical History:   Diagnosis Date    Allergic rhinitis      Past Surgical History:   Procedure Laterality Date     SECTION       Family History: family history includes No Known Problems in her father and mother.    Social History:  reports that she has never smoked. She has never used smokeless tobacco. She reports current alcohol use. Drug use questions deferred to the physician.    Medications:    Current Outpatient Medications:     fexofenadine (ALLEGRA) 60 MG tablet, Take 1 tablet by mouth Daily., Disp: , Rfl:     fluticasone (FLONASE) 50 MCG/ACT nasal spray, 1 spray into the nostril(s) as directed by provider 2 (Two) Times a Day., Disp: 1 bottle, Rfl: 6    pantoprazole (PROTONIX) 40 MG EC tablet, TAKE 1 TABLET BY MOUTH 2 (TWO) TIMES A DAY., Disp: 60 tablet, Rfl: 0    phentermine (ADIPEX-P) 37.5 MG tablet, , Disp: , Rfl:     sertraline (ZOLOFT) 50 MG tablet, Take 1 tablet by mouth Daily., Disp: , Rfl:     topiramate (TOPAMAX) 25 MG tablet, , Disp: , Rfl:     busPIRone (BUSPAR) 7.5 MG tablet, Every 12 (Twelve) Hours., Disp: , Rfl:     cyclobenzaprine (FLEXERIL) 10 MG tablet, Take 1 tablet by mouth Every Night., Disp: 30 tablet, Rfl: 0    meloxicam (Mobic) 15 MG tablet, Take 1 tablet by mouth Daily., Disp: 30 tablet, Rfl: 0    Allergies:  Patient has no known allergies.    OBJECTIVE:  Objective   Ht 162.6 cm (64\")   Wt 80.1 " kg (176 lb 9.6 oz)   BMI 30.31 kg/m²   Physical Exam  Vitals and nursing note reviewed.   Constitutional:       General: She is not in acute distress.     Appearance: Normal appearance. She is well-developed and well-groomed. She is obese. She is not ill-appearing, toxic-appearing or diaphoretic.      Comments: BMI 30.31   HENT:      Head: Normocephalic and atraumatic.      Right Ear: Hearing normal.      Left Ear: Hearing normal.   Eyes:      General: Lids are normal.      Extraocular Movements: Extraocular movements intact.      Conjunctiva/sclera: Conjunctivae normal.      Pupils: Pupils are equal, round, and reactive to light.   Neck:      Trachea: Trachea normal.   Cardiovascular:      Rate and Rhythm: Normal rate and regular rhythm.   Pulmonary:      Effort: Pulmonary effort is normal. No tachypnea, bradypnea, accessory muscle usage or respiratory distress.   Abdominal:      Palpations: Abdomen is soft.   Musculoskeletal:      Cervical back: Full passive range of motion without pain and neck supple.   Skin:     General: Skin is warm and dry.   Neurological:      GCS: GCS eye subscore is 4. GCS verbal subscore is 5. GCS motor subscore is 6.      Coordination: Coordination is intact.      Deep Tendon Reflexes:      Reflex Scores:       Tricep reflexes are 3+ on the right side and 3+ on the left side.       Bicep reflexes are 3+ on the right side and 3+ on the left side.       Brachioradialis reflexes are 3+ on the right side and 3+ on the left side.       Patellar reflexes are 3+ on the right side and 3+ on the left side.       Achilles reflexes are 3+ on the right side and 3+ on the left side.  Psychiatric:         Speech: Speech normal.         Behavior: Behavior normal. Behavior is cooperative.     Neurological Exam  Mental Status  Awake, alert and oriented to person, place and time. Speech is normal. Language is fluent with no aphasia. Attention and concentration are normal.    Cranial Nerves  CN I: Sense  of smell is normal.  CN II: Visual acuity is normal.  CN III, IV, VI: Extraocular movements intact bilaterally. Normal lids and orbits bilaterally. Pupils equal round and reactive to light bilaterally.  CN V: Facial sensation is normal.  CN VII: Full and symmetric facial movement.  CN IX, X: Palate elevates symmetrically  CN XI: Shoulder shrug strength is normal.  CN XII: Tongue midline without atrophy or fasciculations.    Motor  Normal muscle bulk throughout. Normal muscle tone.                                               Right                     Left  Toe extension                        5                          5                                             Right                     Left  Deltoid                                   5                          5   Biceps                                   5                          5   Triceps                                  5                          5   Wrist extensor                       5                          5   Iliopsoas                               5                          5   Quadriceps                           5                          5   Anterior tibialis                      5                          5   Posterior tibialis                    5                          5    Sensory  Sensation is intact to light touch, pinprick, vibration and proprioception in all four extremities.    Reflexes                                            Right                      Left  Brachioradialis                    3+                         3+  Biceps                                 3+                         3+  Triceps                                3+                         3+  Patellar                                3+                         3+  Achilles                                3+                         3+  Right Plantar: downgoing  Left Plantar: downgoing    Right pathological reflexes: Nay's absent. Ankle clonus present.  Left  pathological reflexes: Nay's absent. Ankle clonus absent.  3-4 beats of clonus noted on the right.    Coordination    Finger-to-nose, rapid alternating movements and heel-to-shin normal bilaterally without dysmetria.    Gait  Casual gait is normal including stance, stride, and arm swing.    Peripheral Nerve Specials  Motor:   Right Left   Wrist Extension 5 5   DIP flexion 1st digit 5 5   DIP flexion 2nd digit 5 5   DIP flexion 3rd digit 5 5   DIP flexion 4th digit 5 5   DIP flexion 5th digit 5 5   Opponens Pollicis Longus 5 5     Bulk:   Right Left   Thenar Atrophy No No   Hypothenar Atrophy No No   First Dorsal Interosseus Atrophy No No   Benedictine Sign No No   Waternberg's Sign No No   Prehensile , Froment's sign (Ulnar) No No     Tinel Sign:   Right Left   Cubital Tunnel negative negative   Carpal Tunnel negative positive     Female  strength (pounds)  AGE Right Hand RH Norms Left Hand LH Norms   20-24  70±14.5  61±13.1   25-29  75±13.9  63.5±12   30-34  79±19.2  68±17.7   35-39  74±10.8  66±11.7   40-44 *75 70±13.5 55 62±13.8   45-49  62±15.1  56±12.7   50-54  66±11.6  57±10.7   55-59  57±12.5  47±11.9   60-64  55±10.1  46±10.1   65-69  50±9.7  41±8.2   70-74  50±11.7  42±10.2   75+  43±11.0  38±8.9   (ARMIN Baker et al; Hand Dynometer: Effects of trials and sessions.  Perpetual and Motor Skills 61:195-8, 1985)  * = Dominant hand  > = Intervention    Imaging: (independent review and interpretation)  7/26/2024.  MRI of the cervical spine shows no STIR signal change to suggest acute fractures, no bone marrow signal change, no T2 signal change within the central cord, reversal of the normal cervical lordosis, multilevel degenerative disc disease and broad-based disc protrusions resulting in mild to moderate thecal sac compression at C5-6 and to a lesser extent at C6-7, no significant central canal stenosis, subsequent finding of 7 mm of tonsillar ectopy below the foramen magnum consistent with a Chiari I  malformation.                  ASSESSMENT/ PLAN:  Jeni Aguilar is a 39 y.o. female with significant medical comorbidities to include GERD, anxiety, and obesity.   She presents with a new problem of neck stiffness and bilateral hand numbness and tingling in a stocking glove distribution from the wrist, 75% arms, 25% neck.  Physical exam findings of a positive Tinel's over the left median nerve, gross hyperreflexia, 3-4 beats of clonus on the right.  Her imaging shows mild to moderate thecal sac compression at C5-6 and to a lesser extent at C6-7, no significant central canal stenosis, subsequent finding of 7 mm of tonsillar ectopy below the foramen magnum consistent with a Chiari I malformation.    RECOMMENDATIONS ...  Cervicalgia  Bilateral upper extremity numbness and tingling  For further evaluation we will proceed today by obtaining a noncontrasted CT of the cervical spine.  We also send them for an EMG and nerve conduction study of the bilateral upper extremities to confirm or refute radiculopathy from the cervical spine and/or a peripheral neuropathy as a causative factor for their upper extremity dysesthesias.    As a means of first-line conservative management for neck pain, we will send her for a dedicated course of physician directed physical therapy; Rx provided.    Jeni denies a history of renal insufficiency or contraindications for NSAIDs, therefore I recommended she discontinue ibuprofen and/or Aleve and we will provide him with a trial prescription(s) for Mobic and Flexeril.  Benefits, risk, adverse effects, and use discussed. Once all testing is complete we will have her follow-up with Dr. Downey for reassessment and to discuss the need for surgical intervention.  I advised the patient to call to return sooner for new or worsening complaints of weakness, paresthesias, gait disturbances, or any additional concerns.  Treatment options discussed in detail with Jeni and they voiced  understanding and agree with this plan of care.    Chiari malformation  Chiari malformation is a congenital condition associated with anatomic defects at the skull base.  There are several types of Chiari malformation: Type I is the most common form.    Normal: Mean 1 mm above the foramen magnum with a range range from 8 mm above to 5 mm below the foramen magnum.  Chiari I:  Mean 13 mm below the foramen magnum with a range from range 3-29 mm below the foramen magnum.    Common presenting symptoms may include recurrent generalized and/or occipital headaches, vertigo, and neck pain.      Jeni has imaging findings consistent with a mild Chiari Type 1malformation. We discussed the signs, symptoms and prognosis of this condition.  The most common presenting symptoms include pain (69%) headache (83%), weakness in one or more limbs (56%), numbness (52%) and ataxia or unsteadiness (40%).  Lesser-known symptoms include diplopia (13%) dysphasia (8%) and deafness, fainting, or facial numbness (3%).    The three main sympom clusters for Chiari presentation are foramen magnum compression syndrome (22%), central cord syndrome (65%), and cerebellar syndrome (11%).  10% will present with only headaches.      Ms. Aguilar has no complaints of occipital headaches, however she complains of neck stiffness, bilateral upper extremity numbness and tingling in a stocking glove distribution from the elbow, and persistent tinnitus.  Additionally, she has physical exam findings to include gross hyperreflexia with 3-4 beats of clonus on the right.  I have not found her MRI of the cervical spine to show any significant cervical stenosis that would warrant her physical exam findings, however she has a subsequent finding of 7 mm's of tonsillar ectopy below the foramen magnum consistent with a Chiari I malformation.  Given her imaging and physical exam findings, I am concerned that her complaints may be directly related to her Chiari.       Class 1 obesity (BMI 30.0-34.9) due to excessive calories without serious comorbidities BMI of 30.0-30.9 in adult  Body mass index is 30.31 kg/m². Information on the DASH diet provided in the AVS.  We will continue to provided diet and exercise information with the goal of weight loss at each scheduled appointment.     Diagnoses and all orders for this visit:    1. Cervicalgia (Primary)  -     Ambulatory Referral to Physical Therapy for Evaluation & Treatment  -     CT Cervical Spine Without Contrast; Future  -     meloxicam (Mobic) 15 MG tablet; Take 1 tablet by mouth Daily.  Dispense: 30 tablet; Refill: 0  -     cyclobenzaprine (FLEXERIL) 10 MG tablet; Take 1 tablet by mouth Every Night.  Dispense: 30 tablet; Refill: 0    2. Numbness and tingling of both upper extremities  -     Ambulatory Referral to Physical Therapy for Evaluation & Treatment  -     EMG & Nerve Conduction Test; Future    3. Chiari I malformation    4. Class 1 obesity due to excess calories without serious comorbidity with body mass index (BMI) of 30.0 to 30.9 in adult      Return for FOLLOW UP WITH DR. LACEY NEXT AVAILABLE WITH EMG AND CT.    Thank you for this Consultation and the opportunity to participate in Jeni's care.    Sincerely,  JERRY Greenberg

## 2024-09-10 NOTE — PATIENT INSTRUCTIONS
"DASH Eating Plan  DASH stands for Dietary Approaches to Stop Hypertension. The DASH eating plan is a healthy eating plan that has been shown to:  Lower high blood pressure (hypertension).  Reduce your risk for type 2 diabetes, heart disease, and stroke.  Help with weight loss.  What are tips for following this plan?  Reading food labels  Check food labels for the amount of salt (sodium) per serving. Choose foods with less than 5 percent of the Daily Value (DV) of sodium. In general, foods with less than 300 milligrams (mg) of sodium per serving fit into this eating plan.  To find whole grains, look for the word \"whole\" as the first word in the ingredient list.  Shopping  Buy products labeled as \"low-sodium\" or \"no salt added.\"  Buy fresh foods. Avoid canned foods and pre-made or frozen meals.  Cooking  Try not to add salt when you cook. Use salt-free seasonings or herbs instead of table salt or sea salt. Check with your health care provider or pharmacist before using salt substitutes.  Do not ruelas foods. Cook foods in healthy ways, such as baking, boiling, grilling, roasting, or broiling.  Cook using oils that are good for your heart. These include olive, canola, avocado, soybean, and sunflower oil.  Meal planning    Eat a balanced diet. This should include:  4 or more servings of fruits and 4 or more servings of vegetables each day. Try to fill half of your plate with fruits and vegetables.  6-8 servings of whole grains each day.  6 or less servings of lean meat, poultry, or fish each day. 1 oz is 1 serving. A 3 oz (85 g) serving of meat is about the same size as the palm of your hand. One egg is 1 oz (28 g).  2-3 servings of low-fat dairy each day. One serving is 1 cup (237 mL).  1 serving of nuts, seeds, or beans 5 times each week.  2-3 servings of heart-healthy fats. Healthy fats called omega-3 fatty acids are found in foods such as walnuts, flaxseeds, fortified milks, and eggs. These fats are also found in " cold-water fish, such as sardines, salmon, and mackerel.  Limit how much you eat of:  Canned or prepackaged foods.  Food that is high in trans fat, such as fried foods.  Food that is high in saturated fat, such as fatty meat.  Desserts and other sweets, sugary drinks, and other foods with added sugar.  Full-fat dairy products.  Do not salt foods before eating.  Do not eat more than 4 egg yolks a week.  Try to eat at least 2 vegetarian meals a week.  Eat more home-cooked food and less restaurant, buffet, and fast food.  Lifestyle  When eating at a restaurant, ask if your food can be made with less salt or no salt.  If you drink alcohol:  Limit how much you have to:  0-1 drink a day if you are female.  0-2 drinks a day if you are male.  Know how much alcohol is in your drink. In the [place] one drink is one 12 oz bottle of beer (355 mL), one 5 oz glass of wine (148 mL), or one 1½ oz glass of hard liquor (44 mL).  General information  Avoid eating more than 2,300 mg of salt a day. If you have hypertension, you may need to reduce your sodium intake to 1,500 mg a day.  Work with your provider to stay at a healthy body weight or lose weight. Ask what the best weight range is for you.  On most days of the week, get at least 30 minutes of exercise that causes your heart to beat faster. This may include walking, swimming, or biking.  Work with your provider or dietitian to adjust your eating plan to meet your specific calorie needs.  What foods should I eat?  Fruits  All fresh, dried, or frozen fruit. Canned fruits that are in their natural juice and do not have sugar added to them.  Vegetables  Fresh or frozen vegetables that are raw, steamed, roasted, or grilled. Low-sodium or reduced-sodium tomato and vegetable juice. Low-sodium or reduced-sodium tomato sauce and tomato paste. Low-sodium or reduced-sodium canned vegetables.  Grains  Whole-grain or whole-wheat bread. Whole-grain or whole-wheat pasta. Brown rice. Oatmeal.  Quinoa. Bulgur. Whole-grain and low-sodium cereals. Farzaneh bread. Low-fat, low-sodium crackers. Whole-wheat flour tortillas.  Meats and other proteins  Skinless chicken or turkey. Ground chicken or turkey. Pork with fat trimmed off. Fish and seafood. Egg whites. Dried beans, peas, or lentils. Unsalted nuts, nut butters, and seeds. Unsalted canned beans. Lean cuts of beef with fat trimmed off. Low-sodium, lean precooked or cured meat, such as sausages or meat loaves.  Dairy  Low-fat (1%) or fat-free (skim) milk. Reduced-fat, low-fat, or fat-free cheeses. Nonfat, low-sodium ricotta or cottage cheese. Low-fat or nonfat yogurt. Low-fat, low-sodium cheese.  Fats and oils  Soft margarine without trans fats. Vegetable oil. Reduced-fat, low-fat, or light mayonnaise and salad dressings (reduced-sodium). Canola, safflower, olive, avocado, soybean, and sunflower oils. Avocado.  Seasonings and condiments  Herbs. Spices. Seasoning mixes without salt.  Other foods  Unsalted popcorn and pretzels. Fat-free sweets.  The items listed above may not be all the foods and drinks you can have. Talk to a dietitian to learn more.  What foods should I avoid?  Fruits  Canned fruit in a light or heavy syrup. Fried fruit. Fruit in cream or butter sauce.  Vegetables  Creamed or fried vegetables. Vegetables in a cheese sauce. Regular canned vegetables that are not marked as low-sodium or reduced-sodium. Regular canned tomato sauce and paste that are not marked as low-sodium or reduced-sodium. Regular tomato and vegetable juices that are not marked as low-sodium or reduced-sodium. Pickles. Olives.  Grains  Baked goods made with fat, such as croissants, muffins, or some breads. Dry pasta or rice meal packs.  Meats and other proteins  Fatty cuts of meat. Ribs. Fried meat. Guthrie. Bologna, salami, and other precooked or cured meats, such as sausages or meat loaves, that are not lean and low in sodium. Fat from the back of a pig (fatback). [place].  Salted nuts and seeds. Canned beans with added salt. Canned or smoked fish. Whole eggs or egg yolks. Chicken or turkey with skin.  Dairy  Whole or 2% milk, cream, and half-and-half. Whole or full-fat cream cheese. Whole-fat or sweetened yogurt. Full-fat cheese. Nondairy creamers. Whipped toppings. Processed cheese and cheese spreads.  Fats and oils  Butter. Stick margarine. Lard. Shortening. Ghee. Guthrie fat. Tropical oils, such as coconut, palm kernel, or palm oil.  Seasonings and condiments  Onion salt, garlic salt, seasoned salt, table salt, and sea salt. Worcestershire sauce. Tartar sauce. Barbecue sauce. Teriyaki sauce. Soy sauce, including reduced-sodium soy sauce. Steak sauce. Canned and packaged gravies. Fish sauce. Oyster sauce. Cocktail sauce. Store-bought horseradish. Ketchup. Mustard. Meat flavorings and tenderizers. Bouillon cubes. Hot sauces. Pre-made or packaged marinades. Pre-made or packaged taco seasonings. Relishes. Regular salad dressings.  Other foods  Salted popcorn and pretzels.  The items listed above may not be all the foods and drinks you should avoid. Talk to a dietitian to learn more.  Where to find more information  National Heart, Lung, and Blood [place] (NHLBI): nhlbi.nih.gov  American Heart Association (AHA): heart.org  Academy of Nutrition and Dietetics: eatright.org  National Kidney Foundation (NKF): kidney.org  This information is not intended to replace advice given to you by your health care provider. Make sure you discuss any questions you have with your health care provider.  Document Revised: 01/04/2024 Document Reviewed: 01/04/2024  Elsevier Patient Education © 2024 [employer]  DASH Eating Plan  DASH stands for Dietary Approaches to Stop Hypertension. The DASH eating plan is a healthy eating plan that has been shown to:  Lower high blood pressure (hypertension).  Reduce your risk for type 2 diabetes, heart disease, and stroke.  Help with weight loss.  What are tips for  "following this plan?  Reading food labels  Check food labels for the amount of salt (sodium) per serving. Choose foods with less than 5 percent of the Daily Value (DV) of sodium. In general, foods with less than 300 milligrams (mg) of sodium per serving fit into this eating plan.  To find whole grains, look for the word \"whole\" as the first word in the ingredient list.  Shopping  Buy products labeled as \"low-sodium\" or \"no salt added.\"  Buy fresh foods. Avoid canned foods and pre-made or frozen meals.  Cooking  Try not to add salt when you cook. Use salt-free seasonings or herbs instead of table salt or sea salt. Check with your health care provider or pharmacist before using salt substitutes.  Do not ruelas foods. Cook foods in healthy ways, such as baking, boiling, grilling, roasting, or broiling.  Cook using oils that are good for your heart. These include olive, canola, avocado, soybean, and sunflower oil.  Meal planning    Eat a balanced diet. This should include:  4 or more servings of fruits and 4 or more servings of vegetables each day. Try to fill half of your plate with fruits and vegetables.  6-8 servings of whole grains each day.  6 or less servings of lean meat, poultry, or fish each day. 1 oz is 1 serving. A 3 oz (85 g) serving of meat is about the same size as the palm of your hand. One egg is 1 oz (28 g).  2-3 servings of low-fat dairy each day. One serving is 1 cup (237 mL).  1 serving of nuts, seeds, or beans 5 times each week.  2-3 servings of heart-healthy fats. Healthy fats called omega-3 fatty acids are found in foods such as walnuts, flaxseeds, fortified milks, and eggs. These fats are also found in cold-water fish, such as sardines, salmon, and mackerel.  Limit how much you eat of:  Canned or prepackaged foods.  Food that is high in trans fat, such as fried foods.  Food that is high in saturated fat, such as fatty meat.  Desserts and other sweets, sugary drinks, and other foods with added " sugar.  Full-fat dairy products.  Do not salt foods before eating.  Do not eat more than 4 egg yolks a week.  Try to eat at least 2 vegetarian meals a week.  Eat more home-cooked food and less restaurant, buffet, and fast food.  Lifestyle  When eating at a restaurant, ask if your food can be made with less salt or no salt.  If you drink alcohol:  Limit how much you have to:  0-1 drink a day if you are female.  0-2 drinks a day if you are male.  Know how much alcohol is in your drink. In the [place] one drink is one 12 oz bottle of beer (355 mL), one 5 oz glass of wine (148 mL), or one 1½ oz glass of hard liquor (44 mL).  General information  Avoid eating more than 2,300 mg of salt a day. If you have hypertension, you may need to reduce your sodium intake to 1,500 mg a day.  Work with your provider to stay at a healthy body weight or lose weight. Ask what the best weight range is for you.  On most days of the week, get at least 30 minutes of exercise that causes your heart to beat faster. This may include walking, swimming, or biking.  Work with your provider or dietitian to adjust your eating plan to meet your specific calorie needs.  What foods should I eat?  Fruits  All fresh, dried, or frozen fruit. Canned fruits that are in their natural juice and do not have sugar added to them.  Vegetables  Fresh or frozen vegetables that are raw, steamed, roasted, or grilled. Low-sodium or reduced-sodium tomato and vegetable juice. Low-sodium or reduced-sodium tomato sauce and tomato paste. Low-sodium or reduced-sodium canned vegetables.  Grains  Whole-grain or whole-wheat bread. Whole-grain or whole-wheat pasta. Brown rice. Oatmeal. Quinoa. Bulgur. Whole-grain and low-sodium cereals. Farzaneh bread. Low-fat, low-sodium crackers. Whole-wheat flour tortillas.  Meats and other proteins  Skinless chicken or turkey. Ground chicken or turkey. Pork with fat trimmed off. Fish and seafood. Egg whites. Dried beans, peas, or lentils.  Unsalted nuts, nut butters, and seeds. Unsalted canned beans. Lean cuts of beef with fat trimmed off. Low-sodium, lean precooked or cured meat, such as sausages or meat loaves.  Dairy  Low-fat (1%) or fat-free (skim) milk. Reduced-fat, low-fat, or fat-free cheeses. Nonfat, low-sodium ricotta or cottage cheese. Low-fat or nonfat yogurt. Low-fat, low-sodium cheese.  Fats and oils  Soft margarine without trans fats. Vegetable oil. Reduced-fat, low-fat, or light mayonnaise and salad dressings (reduced-sodium). Canola, safflower, olive, avocado, soybean, and sunflower oils. Avocado.  Seasonings and condiments  Herbs. Spices. Seasoning mixes without salt.  Other foods  Unsalted popcorn and pretzels. Fat-free sweets.  The items listed above may not be all the foods and drinks you can have. Talk to a dietitian to learn more.  What foods should I avoid?  Fruits  Canned fruit in a light or heavy syrup. Fried fruit. Fruit in cream or butter sauce.  Vegetables  Creamed or fried vegetables. Vegetables in a cheese sauce. Regular canned vegetables that are not marked as low-sodium or reduced-sodium. Regular canned tomato sauce and paste that are not marked as low-sodium or reduced-sodium. Regular tomato and vegetable juices that are not marked as low-sodium or reduced-sodium. Pickles. Olives.  Grains  Baked goods made with fat, such as croissants, muffins, or some breads. Dry pasta or rice meal packs.  Meats and other proteins  Fatty cuts of meat. Ribs. Fried meat. Guthrie. Bologna, salami, and other precooked or cured meats, such as sausages or meat loaves, that are not lean and low in sodium. Fat from the back of a pig (fatback). Bratwurst. Salted nuts and seeds. Canned beans with added salt. Canned or smoked fish. Whole eggs or egg yolks. Chicken or turkey with skin.  Dairy  Whole or 2% milk, cream, and half-and-half. Whole or full-fat cream cheese. Whole-fat or sweetened yogurt. Full-fat cheese. Nondairy creamers. Whipped  toppings. Processed cheese and cheese spreads.  Fats and oils  Butter. Stick margarine. Lard. Shortening. Ghee. Guthrie fat. Tropical oils, such as coconut, palm kernel, or palm oil.  Seasonings and condiments  Onion salt, garlic salt, seasoned salt, table salt, and sea salt. [facility] Hospital[facility] sauce. Tartar sauce. Barbecue sauce. Teriyaki sauce. Soy sauce, including reduced-sodium soy sauce. Steak sauce. Canned and packaged gravies. Fish sauce. Oyster sauce. Cocktail sauce. Store-bought horseradish. Ketchup. Mustard. Meat flavorings and tenderizers. Bouillon cubes. Hot sauces. Pre-made or packaged marinades. Pre-made or packaged taco seasonings. Relishes. Regular salad dressings.  Other foods  Salted popcorn and pretzels.  The items listed above may not be all the foods and drinks you should avoid. Talk to a dietitian to learn more.  Where to find more information  National Heart, Lung, and Blood [place] (NHLBI): nhlbi.nih.gov  American Heart Association (AHA): heart.org  Academy of Nutrition and Dietetics: eatright.org  National Kidney Foundation (NKF): kidney.org  This information is not intended to replace advice given to you by your health care provider. Make sure you discuss any questions you have with your health care provider.  Document Revised: 01/04/2024 Document Reviewed: 01/04/2024  Elsevier Patient Education © 2024 [employer]

## 2024-09-13 ENCOUNTER — PATIENT ROUNDING (BHMG ONLY) (OUTPATIENT)
Dept: NEUROSURGERY | Facility: CLINIC | Age: 39
End: 2024-09-13
Payer: COMMERCIAL

## 2024-09-13 NOTE — PROGRESS NOTES
A My-Chart message has been sent to the patient for [facility].

## 2024-09-23 ENCOUNTER — HOSPITAL ENCOUNTER (OUTPATIENT)
Dept: NEUROLOGY | Facility: HOSPITAL | Age: 39
Discharge: HOME | End: 2024-09-23
Admitting: NURSE PRACTITIONER
Payer: COMMERCIAL

## 2024-09-23 DIAGNOSIS — R20.2 NUMBNESS AND TINGLING OF BOTH UPPER EXTREMITIES: ICD-10-CM

## 2024-09-23 DIAGNOSIS — R20.0 NUMBNESS AND TINGLING OF BOTH UPPER EXTREMITIES: ICD-10-CM

## 2024-09-23 PROCEDURE — 95886 MUSC TEST DONE W/N TEST COMP: CPT

## 2024-09-23 PROCEDURE — 95911 NRV CNDJ TEST 9-10 STUDIES: CPT

## 2024-10-01 ENCOUNTER — TELEPHONE (OUTPATIENT)
Dept: NEUROSURGERY | Facility: CLINIC | Age: 39
End: 2024-10-01
Payer: COMMERCIAL

## 2024-10-01 NOTE — TELEPHONE ENCOUNTER
----- Message from Josué Crowley sent at 9/30/2024  2:58 PM CDT -----  Please review,     39 y.o. female with significant medical comorbidities to include GERD, anxiety, and obesity.   She presents with a new problem of neck stiffness and bilateral hand numbness and tingling in a stocking glove distribution from the wrist, 75% arms, 25% neck.  Physical exam findings of a positive Tinel's over the left median nerve, gross hyperreflexia, 3-4 beats of clonus on the right.  Her imaging shows mild to moderate thecal sac compression at C5-6 and to a lesser extent at C6-7, no significant central canal stenosis, subsequent finding of 7 mm of tonsillar ectopy below the foramen magnum consistent with a Chiari I malformation.    Rx for PT provided    Scheduled for follow-up with you on 12/16/2024      Thank you,    JERRY Greenberg  ----- Message -----  From: Interface, Scans Incoming  Sent: 9/23/2024  11:56 AM CDT  To: JERRY Greenberg

## 2024-10-04 ENCOUNTER — HOSPITAL ENCOUNTER (OUTPATIENT)
Dept: CT IMAGING | Facility: HOSPITAL | Age: 39
Discharge: HOME | End: 2024-10-04
Admitting: NURSE PRACTITIONER
Payer: COMMERCIAL

## 2024-10-04 DIAGNOSIS — M54.2 CERVICALGIA: ICD-10-CM

## 2024-10-04 PROCEDURE — 72125 CT NECK SPINE W/O DYE: CPT

## 2024-10-07 ENCOUNTER — TELEPHONE (OUTPATIENT)
Dept: NEUROSURGERY | Facility: CLINIC | Age: 39
End: 2024-10-07
Payer: COMMERCIAL

## 2024-10-08 ENCOUNTER — DOCUMENTATION (OUTPATIENT)
Dept: NEUROSURGERY | Facility: CLINIC | Age: 39
End: 2024-10-08
Payer: COMMERCIAL

## 2024-10-14 ENCOUNTER — TELEPHONE (OUTPATIENT)
Dept: NEUROSURGERY | Facility: CLINIC | Age: 39
End: 2024-10-14
Payer: COMMERCIAL

## 2024-10-15 ENCOUNTER — TELEPHONE (OUTPATIENT)
Dept: NEUROSURGERY | Facility: CLINIC | Age: 39
End: 2024-10-15
Payer: COMMERCIAL

## 2024-10-15 DIAGNOSIS — G56.03 BILATERAL CARPAL TUNNEL SYNDROME: Primary | ICD-10-CM

## 2024-10-16 DIAGNOSIS — M54.2 CERVICALGIA: ICD-10-CM

## 2024-10-16 RX ORDER — MELOXICAM 15 MG/1
15 TABLET ORAL DAILY
Qty: 30 TABLET | Refills: 0 | Status: SHIPPED | OUTPATIENT
Start: 2024-10-16 | End: 2024-10-17 | Stop reason: SDUPTHER

## 2024-10-17 DIAGNOSIS — M54.2 CERVICALGIA: ICD-10-CM

## 2024-10-17 RX ORDER — MELOXICAM 15 MG/1
15 TABLET ORAL DAILY
Qty: 30 TABLET | Refills: 0 | Status: SHIPPED | OUTPATIENT
Start: 2024-10-17

## 2024-11-04 ENCOUNTER — TELEPHONE (OUTPATIENT)
Dept: NEUROSURGERY | Facility: CLINIC | Age: 39
End: 2024-11-04
Payer: COMMERCIAL

## 2024-11-21 DIAGNOSIS — M54.2 CERVICALGIA: ICD-10-CM

## 2024-11-21 RX ORDER — MELOXICAM 15 MG/1
15 TABLET ORAL DAILY
Qty: 30 TABLET | Refills: 0 | Status: SHIPPED | OUTPATIENT
Start: 2024-11-21

## 2024-12-16 ENCOUNTER — OFFICE VISIT (OUTPATIENT)
Dept: NEUROSURGERY | Facility: CLINIC | Age: 39
End: 2024-12-16
Payer: COMMERCIAL

## 2024-12-16 VITALS — HEIGHT: 64 IN | WEIGHT: 176 LBS | BODY MASS INDEX: 30.05 KG/M2

## 2024-12-16 DIAGNOSIS — G93.5 CHIARI I MALFORMATION: Primary | ICD-10-CM

## 2024-12-16 DIAGNOSIS — M54.12 CERVICAL RADICULOPATHY: ICD-10-CM

## 2024-12-16 DIAGNOSIS — E66.811 CLASS 1 OBESITY DUE TO EXCESS CALORIES WITHOUT SERIOUS COMORBIDITY WITH BODY MASS INDEX (BMI) OF 30.0 TO 30.9 IN ADULT: ICD-10-CM

## 2024-12-16 DIAGNOSIS — M50.30 DDD (DEGENERATIVE DISC DISEASE), CERVICAL: ICD-10-CM

## 2024-12-16 DIAGNOSIS — E66.09 CLASS 1 OBESITY DUE TO EXCESS CALORIES WITHOUT SERIOUS COMORBIDITY WITH BODY MASS INDEX (BMI) OF 30.0 TO 30.9 IN ADULT: ICD-10-CM

## 2024-12-16 DIAGNOSIS — G56.03 BILATERAL CARPAL TUNNEL SYNDROME: ICD-10-CM

## 2024-12-16 PROCEDURE — 99214 OFFICE O/P EST MOD 30 MIN: CPT | Performed by: NEUROLOGICAL SURGERY

## 2024-12-16 NOTE — PROGRESS NOTES
Primary Care Provider: Salena Min PA    Chief Complaint:   Chief Complaint   Patient presents with    Neck Pain     Patient here follow up with EMG/NCS for review.     History of Present Illness    HISTORY/ HPI:  Jeni Aguilar is a 39 y.o.  female who present today with a complaint of neck pain and bilateral hand numbness and tingling, 75% arms, 25% neck.  No previous spine surgeries.  No known injury.    History of Present Illness  The patient presents for evaluation of neck pain, numbness and tingling in her hands, and carpal tunnel syndrome.    She has been experiencing intermittent symptoms over the past 2 to 3 years, initially presenting as complete numbness in her hands. She reports a sensation of weight on her shoulders and hips, and expresses a desire for surgical intervention for her neck if conservative therapy proves ineffective. She also mentions that her neck lacks the normal curvature, as noted by her chiropractor. She experiences severe headaches at least once a week, which she attributes to stress. Physical therapy has taught her that maintaining a straight head position can prevent this numbness. However, she describes a constant effort to keep her head upright, leading to discomfort in her neck. Her symptoms worsen when she leans her head forward and generally improve when she keeps her head up, although this position exacerbates her neck pain. She has been managing her symptoms with physical therapy, home exercises, a neck massager, heating pad, and cervical traction device. She also applies heat and ice as needed.    She has been experiencing an unusual sensation in the cubital area of her arm since spring, which has recently radiated into her shoulder, causing pain in her entire left arm. She also reports clenching her teeth, which has resulted in two fractured teeth, and uses a bite guard. She is uncertain whether her neck and shoulder muscles are contributing to her neck  pain or if it is due to bone or nerve issues. She reports occasional difficulty with fine motor tasks such as writing or handling small objects, and has experienced instances of dropping items. She works as a dental assistant and has been off Mobic for approximately 2 weeks, but does not believe it provided significant relief. She reports that alcohol consumption consistently improves her symptoms. She does not smoke and reports no falls. She reports frequent urination and has a history of a . She notes that her left hand symptoms are more severe than her right, but have improved with physical therapy. She reports a significant weight gain over the past 6 months and admits to a poor diet. She attempts to exercise at the gym but is hesitant to engage in other activities due to previous difficult lala. She recalls an incident two years ago where she applied mulch around her house, resulting in numbness in both hands, particularly her left index finger and thumb. This numbness persisted throughout her stay in [place], even with alcohol consumption, but resolved upon her return to work. She also reports that her neck problems have become more debilitating, particularly when she has a cough, and that her symptoms are affecting her ability to perform household tasks.    SOCIAL HISTORY  She does not smoke.    FAMILY HISTORY  Her father had Parkinson's disease, which progressed into parkinsonism.    MEDICATIONS  Discontinued: Mobic     Oswestry Disability Index, Cervical = 42%  SCORE INTERPRETATION OF THE OSWESTRY NECK DISABILITY QUESTIONNAIRE  30-48: Moderate disability    Score   Pain Intensity Very mild pain - 1   Personal Care Look after myself normally but causes extra pain-1   Lifting Lift heavy weights but gives extra pain-1   Reading Cannot read because of moderate pain-3   Headaches Severe frequent headaches-4   Concentration A lot of difficulty concentrating-3   Work I can do my usual work, but no  more-2   Driving Drive with moderate pain-2   Sleeping 1 to 2-hours of sleepiness-2   Recreation Most recreational activities because of pain-2   (Mesha et al, 1980)      Modified [employer] Association (mJOA) score  CATEGORY SCORE   Upper extremity Motor Subscore Mild difficulty buttoning shirt-4   Lower Extremity Subscore Normal gait-7   Upper Extremity Sensory Score Mild loss of hand sensation-2   Urinary Function Subscore Urinary urgency when coughing-2   TOTAL = 15/18    The mJOA is an 18 point score of functional disability specific to cervical myelopathy.   15-17: Mild Myelopathy  Roslyn et al. (1991). Nish et al.       ROS:  Review of Systems   HENT:  Positive for tinnitus.    Eyes: Negative.  Negative for visual disturbance.   Respiratory: Negative.     Cardiovascular: Negative.    Gastrointestinal: Negative.    Endocrine: Negative.    Genitourinary: Negative.    Musculoskeletal:  Positive for neck pain.   Skin: Negative.    Allergic/Immunologic: Negative.    Neurological:  Positive for weakness. Negative for dizziness, tremors, seizures, syncope, facial asymmetry, speech difficulty, light-headedness and headaches.   Hematological: Negative.    Psychiatric/Behavioral: Negative.     All other systems reviewed and are negative.    Past Medical History:   Diagnosis Date    Allergic rhinitis      Past Surgical History:   Procedure Laterality Date     SECTION       Family History: family history includes No Known Problems in her father and mother.    Social History:  reports that she has never smoked. She has never used smokeless tobacco. She reports current alcohol use. Drug use questions deferred to the physician.    Medications:    Current Outpatient Medications:     busPIRone (BUSPAR) 7.5 MG tablet, Every 12 (Twelve) Hours., Disp: , Rfl:     cyclobenzaprine (FLEXERIL) 10 MG tablet, Take 1 tablet by mouth Every Night., Disp: 30 tablet, Rfl: 0    fexofenadine (ALLEGRA) 60 MG tablet,  "Take 1 tablet by mouth Daily., Disp: , Rfl:     fluticasone (FLONASE) 50 MCG/ACT nasal spray, 1 spray into the nostril(s) as directed by provider 2 (Two) Times a Day., Disp: 1 bottle, Rfl: 6    meloxicam (MOBIC) 15 MG tablet, TAKE 1 TABLET BY MOUTH DAILY, Disp: 30 tablet, Rfl: 0    pantoprazole (PROTONIX) 40 MG EC tablet, TAKE 1 TABLET BY MOUTH 2 (TWO) TIMES A DAY., Disp: 60 tablet, Rfl: 0    phentermine (ADIPEX-P) 37.5 MG tablet, , Disp: , Rfl:     sertraline (ZOLOFT) 50 MG tablet, Take 1 tablet by mouth Daily., Disp: , Rfl:     topiramate (TOPAMAX) 25 MG tablet, , Disp: , Rfl:     Allergies:  Patient has no known allergies.    OBJECTIVE:  Objective   Ht 162.6 cm (64\")   Wt 79.8 kg (176 lb)   BMI 30.21 kg/m²   Physical Exam  Vitals and nursing note reviewed.   Constitutional:       General: She is not in acute distress.     Appearance: Normal appearance. She is well-developed and well-groomed. She is obese. She is not ill-appearing, toxic-appearing or diaphoretic.      Comments: BMI 30.31   HENT:      Head: Normocephalic and atraumatic.      Right Ear: Hearing normal.      Left Ear: Hearing normal.   Eyes:      General: Lids are normal.      Extraocular Movements: Extraocular movements intact.      Conjunctiva/sclera: Conjunctivae normal.      Pupils: Pupils are equal, round, and reactive to light.   Neck:      Trachea: Trachea normal.   Cardiovascular:      Rate and Rhythm: Normal rate and regular rhythm.   Pulmonary:      Effort: Pulmonary effort is normal. No tachypnea, bradypnea, accessory muscle usage or respiratory distress.   Abdominal:      Palpations: Abdomen is soft.   Musculoskeletal:      Cervical back: Full passive range of motion without pain and neck supple.   Skin:     General: Skin is warm and dry.   Neurological:      GCS: GCS eye subscore is 4. GCS verbal subscore is 5. GCS motor subscore is 6.      Coordination: Coordination is intact.      Deep Tendon Reflexes:      Reflex Scores:       " Tricep reflexes are 3+ on the right side and 3+ on the left side.       Bicep reflexes are 3+ on the right side and 3+ on the left side.       Brachioradialis reflexes are 3+ on the right side and 3+ on the left side.       Patellar reflexes are 3+ on the right side and 3+ on the left side.       Achilles reflexes are 3+ on the right side and 3+ on the left side.  Psychiatric:         Speech: Speech normal.         Behavior: Behavior normal. Behavior is cooperative.     Neurological Exam  Mental Status  Awake, alert and oriented to person, place and time. Speech is normal. Language is fluent with no aphasia. Attention and concentration are normal.    Cranial Nerves  CN I: Sense of smell is normal.  CN II: Visual acuity is normal.  CN III, IV, VI: Extraocular movements intact bilaterally. Normal lids and orbits bilaterally. Pupils equal round and reactive to light bilaterally.  CN V: Facial sensation is normal.  CN VII: Full and symmetric facial movement.  CN IX, X: Palate elevates symmetrically  CN XI: Shoulder shrug strength is normal.  CN XII: Tongue midline without atrophy or fasciculations.    Motor  Normal muscle bulk throughout. Normal muscle tone.                                               Right                     Left  Toe extension                        5                          5                                             Right                     Left  Deltoid                                   5                          5   Biceps                                   5                          5   Triceps                                  5                          5   Wrist extensor                       5                          5   Iliopsoas                               5                          5   Quadriceps                           5                          5   Anterior tibialis                      5                          5   Posterior tibialis                    5                           5    Sensory  Sensation is intact to light touch, pinprick, vibration and proprioception in all four extremities.    Reflexes                                            Right                      Left  Brachioradialis                    3+                         3+  Biceps                                 3+                         3+  Triceps                                3+                         3+  Patellar                                3+                         3+  Achilles                                3+                         3+  Right Plantar: downgoing  Left Plantar: downgoing    Right pathological reflexes: Nay's absent. Ankle clonus present.  Left pathological reflexes: Nay's absent. Ankle clonus absent.  3-4 beats of clonus noted on the right.    Coordination    Finger-to-nose, rapid alternating movements and heel-to-shin normal bilaterally without dysmetria.    Gait  Casual gait is normal including stance, stride, and arm swing.    Peripheral Nerve Specials  Motor:   Right Left   Wrist Extension 5 5   DIP flexion 1st digit 5 5   DIP flexion 2nd digit 5 5   DIP flexion 3rd digit 5 5   DIP flexion 4th digit 5 5   DIP flexion 5th digit 5 5   Opponens Pollicis Longus 5 5     Bulk:   Right Left   Thenar Atrophy No No   Hypothenar Atrophy No No   First Dorsal Interosseus Atrophy No No   Benedictine Sign No No   Waternberg's Sign No No   Prehensile , Froment's sign (Ulnar) No No     Tinel Sign:   Right Left   Cubital Tunnel negative negative   Carpal Tunnel negative positive     Female  strength (pounds)  AGE Right Hand RH Norms Left Hand LH Norms   20-24  70±14.5  61±13.1   25-29  75±13.9  63.5±12   30-34  79±19.2  68±17.7   35-39  74±10.8  66±11.7   40-44 *75 70±13.5 55 62±13.8   45-49  62±15.1  56±12.7   50-54  66±11.6  57±10.7   55-59  57±12.5  47±11.9   60-64  55±10.1  46±10.1   65-69  50±9.7  41±8.2   70-74  50±11.7  42±10.2   75+  43±11.0  38±8.9   (ARMIN Baker et al; Hand  Dynometer: Effects of trials and sessions.  Perpetual and Motor Skills 61:195-8, 1985)  * = Dominant hand  > = Intervention    Imaging: (independent review and interpretation)  7/26/2024.  MRI of the cervical spine shows no STIR signal change to suggest acute fractures, no bone marrow signal change, no T2 signal change within the central cord, reversal of the normal cervical lordosis, multilevel degenerative disc disease and broad-based disc protrusions resulting in mild to moderate thecal sac compression at C5-6 and to a lesser extent at C6-7, no significant central canal stenosis, subsequent finding of 7 mm of tonsillar ectopy below the foramen magnum consistent with a Chiari I malformation.                            ASSESSMENT/ PLAN:  Jeni Aguilar is a 39 y.o. female with significant medical comorbidities to include GERD, anxiety, and obesity.   She presents with a new problem of neck stiffness and bilateral hand numbness and tingling in a stocking glove distribution from the wrist, 75% arms, 25% neck.  Physical exam findings of a positive Tinel's over the left median nerve, gross hyperreflexia, 3-4 beats of clonus on the right.  Her imaging shows mild to moderate thecal sac compression at C5-6 and to a lesser extent at C6-7, no significant central canal stenosis, subsequent finding of 7 mm of tonsillar ectopy below the foramen magnum consistent with a Chiari I malformation.    Results  Imaging  MRI shows wear and tear in the disc spaces, with more wear between C5 and C6. Mild cervical stenosis observed. Disc at C5-C6 bulges back slightly, potentially pinching the nerve root. Mild Chiari malformation present.    Testing  EMG and nerve conduction study did not show radiculopathy but showed mild bilateral carpal tunnel syndrome.        RECOMMENDATIONS ...  Cervicalgia  Bilateral upper extremity numbness and tingling  Assessment & Plan  1. Degenerative disc disease.  The MRI shows wear and tear in the disc  spaces, with the most significant degeneration between C5 and C6. Symptoms include neck pain and numbness in the hands. The EMG did not show radiculopathy, but it does not rule out positional cervical radiculopathy. Conservative therapy, including neck exercises, cervical traction, and the use of a cervical pillow, is recommended. Nonsteroidal anti-inflammatories like Mobic can be used for pain management. Surgical options, including anterior cervical discectomy and fusion (ACDF) and cervical disc arthroplasty, were discussed. The patient prefers to try conservative therapy first. If symptoms worsen, she will contact us to discuss further management, including potential surgery.    2. Cervical radiculopathy.  The MRI indicates potential nerve root pinching at the C5-C6 level, which could cause radiculopathy. Symptoms include pain and numbness radiating into the arm. The EMG did not show severe radiculopathy, suggesting no permanent nerve damage. Conservative therapy is recommended initially. Surgical options, including ACDF and cervical disc arthroplasty, were discussed. The patient prefers to try conservative therapy first. If symptoms worsen, she will contact us to discuss further management, including potential surgery.    3. Carpal tunnel syndrome.  The EMG shows mild bilateral carpal tunnel syndrome, which could contribute to numbness and tingling in the first through third fingers. Conservative management is recommended initially. If symptoms persist, a carpal tunnel release procedure may be considered.    Follow-up  The patient will follow up as needed. Call if she would like to proceed with surgery.       Chiari I malformation  Jeni has imaging findings consistent with a mild Chiari Type 1malformation. We discussed the signs, symptoms and prognosis of this condition.  The most common presenting symptoms include pain (69%) headache (83%), weakness in one or more limbs (56%), numbness (52%) and ataxia or  "unsteadiness (40%).  Lesser-known symptoms include diplopia (13%) dysphasia (8%) and deafness, fainting, or facial numbness (3%).    The three main sympom clusters for Chiari presentation are foramen magnum compression syndrome (22%), central cord syndrome (65%), and cerebellar syndrome (11%).  10% will present with only headaches.     At this time I recommend conservative management given given that the patient and family do not consider this condition life limiting at this time.  They are aware of the signs and symptoms to watch for regarding progression. They know to contact my office should they develop. In one study by Lisa and Bijan et al in the Journal of Clinical Sports Medicine showed \"During a total of 1,627 athletic seasons played by patients with Chiari malformation, no athlete stained an injury resulting in death, coma or paralysis.\" This suggests that patients with Chiari are at no significantly increased risk while playing athletic sports.     Class 1 obesity (BMI 30.0-34.9) due to excessive calories without serious comorbidities BMI of 30.0-30.9 in adult  Body mass index is 30.21 kg/m². Information on the DASH diet provided in the AVS.  We will continue to provided diet and exercise information with the goal of weight loss at each scheduled appointment.     Diagnoses and all orders for this visit:    1. Chiari I malformation (Primary)    2. Cervical radiculopathy    3. DDD (degenerative disc disease), cervical    4. Bilateral carpal tunnel syndrome    5. Class 1 obesity due to excess calories without serious comorbidity with body mass index (BMI) of 30.0 to 30.9 in adult        Return will call with surgical decision.    Thank you for this Consultation and the opportunity to participate in Jeni's care.    I spent 39 minutes caring for Jeni on this date of service. This time includes time spent by me in the following activities: preparing for the visit, reviewing tests, obtaining and/or " reviewing a separately obtained history, performing a medically appropriate examination and/or evaluation, counseling and educating the patient/family/caregiver, ordering medications, tests, or procedures, referring and communicating with other health care professionals, documenting information in the medical record, independently interpreting results and communicating that information with the patient/family/caregiver, and/or care coordination.         Sincerely,  Mauricio Downey MD

## 2024-12-16 NOTE — PATIENT INSTRUCTIONS
Conservative therapy  Cervical Fusion  Cervical Artificial Disc  Carpal tunnel release  Cervical Surgery + Carpal Tunnel Release        Cervical Traction for Neck Pain            What is cervical traction?  Traction of the spine, known as cervical traction, is a popular treatment for neck pain and related injuries. Essentially, cervical traction pulls your head away from your neck to create expansion and eliminate compression. It’s considered to be an alternative treatment for neck pain, helping people avoid the need for medication or surgeries. It can be used as part of a physical therapy treatment or on your own at home.  Cervical traction devices lightly stretch the neck to reduce pressure on the spine by pulling or  the vertebrae. It’s said to be both highly effective and fast-acting. Read on to learn more about this technique and how it can be of benefit to you.    Benefits of cervical traction  Cervical traction devices treat different types and causes of neck pain, tension, and tightness. Cervical traction helps to relax the muscles, which can significantly relieve pain and stiffness while increasing flexibility. It’s also used to treat and flatten bulging or herniated disks. It can alleviate pain from joints, sprains, and spasms. It’s also used to treat neck injuries, pinched nerves, and cervical spondylosis.    Cervical traction devices work by stretching the spinal vertebrae and muscles to relieve pressure and pain. Force or tension is used to stretch or pull the head away from the neck. Creating space between the vertebrae relieves compression and allows the muscles to relax. This lengthens or stretches the muscles and joints around the neck.  These improvements may lead to improved mobility, range of motion, and alignment. This will allow you to go about your daily activities with greater ease.    A 2017 meta-analysis of studies analyzed the effectiveness of cervical traction in relieving neck  pain. This report found that the treatment significantly reduced neck pain immediately following treatment. Pain scores were also reduced in the follow-up period. More in-depth, high-quality studies are needed to learn more about the long-term effects of this treatment.     A 2014 study found that mechanical traction was effective in treating people with pinched nerves and neck pain. Mechanical traction was more effective than exercising alone or exercising in addition to using over-door traction.     How it’s done  There are several ways to do cervical traction, either with a physical therapist or on your own at home. Your physical therapist can help you to decide upon the best method to suit your needs.  Your physical therapist may recommend that you buy cervical traction equipment to use at home. Certain devices may require you to have a prescription. Cervical traction devices are available online and in medical supply stores. Your physical therapist should show you how to use the device properly before you use it on your own.  It’s important that you check in with your physical therapist even if you’re doing a home treatment. They’ll make sure you’re doing the best treatment, measure your progress, and adjust your therapy as necessary.    Manual cervical traction  Manual cervical traction is done by a physical therapist. While you’re lying down, they’ll gently pull your head away from your neck. They’ll hold this position for a period of time before releasing and repeating. Your physical therapist will make adjustments to your exact positioning in order to get the best results.    Mechanical cervical traction  Mechanical cervical traction is done by a physical therapist. A harness is attached to your head and neck as you’re lying flat on your back. The harness hooks up to a machine or system of weights that apply traction force to pull your head away from your neck and spine.    Over-the-door cervical  traction  An over-the-door traction device is for home use. You attach your head and neck to a harness. This is connected to a rope that’s part of a weighted pulley system that goes over a door. This can be done while sitting, leaning back, or lying down.     Side effects and warnings  Generally, it’s safe to perform cervical traction, but remember that results are different for everyone. The treatment should be totally pain-free.   It’s possible that you can experience side effects such as headache, dizziness, and nausea upon adjusting your body in this manner. This may even lead to fainting. Stop if you experience any of these side effects, and discuss them with your doctor or physical therapist.   It’s possible for you to injure your tissue, neck, or spine. You should avoid cervical traction if you have:  rheumatoid arthritis   postsurgery hardware such as screws in your neck   a recent fracture or injury in the neck area   a known tumor in the neck area   a bone infection   issues or blockages with vertebral or carotid arteries   osteoporosis   cervical instability   spinal hypermobility  It’s important that you follow any safety instructions and recommendations provided by your doctor or by the . Make sure you’re performing the movements correctly and using the appropriate amount of weight. Don’t overexert yourself by doing cervical traction for too long. Discontinue use if you experience any pain or irritation or if your symptoms get worse.    Cervical traction exercises  There are several exercises that can be done using cervical traction devices. Make sure to listen to your body and go to your own edge or threshold in terms of stretching and the duration of your exercises.     To use an air neck traction device, place it around your neck and adjust the straps as necessary. Then, pump it up and wear it for about 20-30 minutes. Do this a few times throughout the day. You can wear the device while  doing activities where you tend to slouch.  To use an over-the-door neck traction device, you’ll usually you’ll start with about 10-20 pounds of pulling force, which can be increased as you gain strength. Your physical therapist can recommend the right amount of weight for you to use. Pull and hold the weight for 10-20 seconds and then slowly release. Continue this for 15-30 minutes at a time. You can do this a few times throughout the day.    A Posture Pump is used while you’re lying down. Do a warm-up before using this device. Slowly turn the head side-to-side, then forward and backward, and then lean the neck from side-to-side. Do each exercise 10 times. Then, attach the portable device to your head and increase the pressure so it tightens around your forehead. Once it’s pumped, wait 10 seconds before releasing the air. Do this 15 times. Then inflate the unit and relax in a comfortable position for up to 15 minutes. Make sure you’re not pumping it too much, especially in the beginning. Once you release yourself from the pump, keep your head in line with your spine as you come into a standing position. Repeat the warm-up routine.   You may also wish to incorporate stretching into your daily routine. You can use accessories such as exercise balls or resistance bands. Yoga is another great tool to relieve neck pain, and there are plenty of cervical traction exercises your physical therapist may be able to recommend that don’t require any equipment aside from a bed or table.     The takeaway  Cervical traction may be a safe, wonderfully effective way for you to resolve neck pain. It may provide you with numerous improvements to your body, inspiring you to do it often. Ideally it will be effective in relieving neck pain and enhancing your overall function.  Always talk to your doctor or physical therapist before beginning any treatment. Touch base with them throughout your therapy to discuss your improvements as well  as any side effects. They can also help you to set up a treatment plan that addresses exactly what you need to correct.        Cervical Neck Pillows

## 2025-02-11 DIAGNOSIS — M54.2 CERVICALGIA: Primary | ICD-10-CM

## 2025-02-11 RX ORDER — MELOXICAM 15 MG/1
15 TABLET ORAL DAILY
Qty: 30 TABLET | Refills: 0 | Status: SHIPPED | OUTPATIENT
Start: 2025-02-11

## 2025-02-11 NOTE — TELEPHONE ENCOUNTER
Patient would like a new PT order sent and refill on mobic sent to [facility]. I asked that she call me back and tell me where to send PT order. Will send mobic to [facility], order pended for signature.